# Patient Record
Sex: FEMALE | HISPANIC OR LATINO | Employment: FULL TIME | URBAN - METROPOLITAN AREA
[De-identification: names, ages, dates, MRNs, and addresses within clinical notes are randomized per-mention and may not be internally consistent; named-entity substitution may affect disease eponyms.]

---

## 2016-12-26 LAB
EXTERNAL ABO GROUPING: NORMAL
EXTERNAL HEMATOCRIT: 33.1 %
EXTERNAL HEMOGLOBIN: 10.8 G/DL
EXTERNAL HEPATITIS B SURFACE ANTIGEN: NORMAL
EXTERNAL HIV-1 ANTIBODY: NORMAL
EXTERNAL PLATELET COUNT: 160 K/ΜL
EXTERNAL RH FACTOR: POSITIVE
EXTERNAL RUBELLA IGG QUANTITATION: NORMAL
EXTERNAL SYPHILIS RPR SCREEN: NORMAL
GLUCOSE 1H P 50 G GLC PO SERPL-MCNC: 86 MG/DL (ref 70–183)

## 2017-02-01 ENCOUNTER — ALLSCRIPTS OFFICE VISIT (OUTPATIENT)
Dept: OTHER | Facility: OTHER | Age: 37
End: 2017-02-01

## 2017-02-01 DIAGNOSIS — Z34.82 ENCOUNTER FOR SUPERVISION OF OTHER NORMAL PREGNANCY, SECOND TRIMESTER: ICD-10-CM

## 2017-02-01 LAB
GLUCOSE (HISTORICAL): NEGATIVE
PROT UR STRIP-MCNC: NORMAL MG/DL

## 2017-02-20 ENCOUNTER — GENERIC CONVERSION - ENCOUNTER (OUTPATIENT)
Dept: OTHER | Facility: OTHER | Age: 37
End: 2017-02-20

## 2017-03-08 ENCOUNTER — GENERIC CONVERSION - ENCOUNTER (OUTPATIENT)
Dept: OTHER | Facility: OTHER | Age: 37
End: 2017-03-08

## 2017-03-16 ENCOUNTER — GENERIC CONVERSION - ENCOUNTER (OUTPATIENT)
Dept: OTHER | Facility: OTHER | Age: 37
End: 2017-03-16

## 2017-03-16 ENCOUNTER — ALLSCRIPTS OFFICE VISIT (OUTPATIENT)
Dept: PERINATAL CARE | Facility: CLINIC | Age: 37
End: 2017-03-16
Payer: COMMERCIAL

## 2017-03-16 PROCEDURE — 76816 OB US FOLLOW-UP PER FETUS: CPT | Performed by: OBSTETRICS & GYNECOLOGY

## 2017-03-22 ENCOUNTER — GENERIC CONVERSION - ENCOUNTER (OUTPATIENT)
Dept: OTHER | Facility: OTHER | Age: 37
End: 2017-03-22

## 2017-04-01 ENCOUNTER — ALLSCRIPTS OFFICE VISIT (OUTPATIENT)
Dept: OTHER | Facility: OTHER | Age: 37
End: 2017-04-01

## 2017-04-01 LAB — EXTERNAL GROUP B STREP ANTIGEN: NEGATIVE

## 2017-04-03 LAB — STREP GRP B, MOLECULAR (HISTORICAL): NEGATIVE

## 2017-04-21 ENCOUNTER — GENERIC CONVERSION - ENCOUNTER (OUTPATIENT)
Dept: OTHER | Facility: OTHER | Age: 37
End: 2017-04-21

## 2017-04-30 ENCOUNTER — HOSPITAL ENCOUNTER (INPATIENT)
Facility: HOSPITAL | Age: 37
LOS: 2 days | Discharge: HOME | End: 2017-05-02
Attending: OBSTETRICS & GYNECOLOGY | Admitting: OBSTETRICS & GYNECOLOGY
Payer: COMMERCIAL

## 2017-04-30 ENCOUNTER — ANESTHESIA EVENT (INPATIENT)
Dept: LABOR AND DELIVERY | Facility: HOSPITAL | Age: 37
End: 2017-04-30
Payer: COMMERCIAL

## 2017-04-30 ENCOUNTER — ANESTHESIA (INPATIENT)
Dept: LABOR AND DELIVERY | Facility: HOSPITAL | Age: 37
End: 2017-04-30
Payer: COMMERCIAL

## 2017-04-30 DIAGNOSIS — Z3A.39 39 WEEKS GESTATION OF PREGNANCY: Primary | ICD-10-CM

## 2017-04-30 LAB
ABO GROUP BLD: NORMAL
BASE EXCESS BLDCOA CALC-SCNC: -2.7 MMOL/L (ref 3–11)
BASE EXCESS BLDCOV CALC-SCNC: -3.5 MMOL/L (ref 1–9)
BASOPHILS # BLD AUTO: 0.01 THOUSANDS/ΜL (ref 0–0.1)
BASOPHILS NFR BLD AUTO: 0 % (ref 0–1)
BLD GP AB SCN SERPL QL: NEGATIVE
EOSINOPHIL # BLD AUTO: 0.03 THOUSAND/ΜL (ref 0–0.61)
EOSINOPHIL NFR BLD AUTO: 0 % (ref 0–6)
ERYTHROCYTE [DISTWIDTH] IN BLOOD BY AUTOMATED COUNT: 14.3 % (ref 11.6–15.1)
HCO3 BLDCOA-SCNC: 23.7 MMOL/L (ref 17.3–27.3)
HCO3 BLDCOV-SCNC: 21.2 MMOL/L (ref 12.2–28.6)
HCT VFR BLD AUTO: 35.8 % (ref 34.8–46.1)
HGB BLD-MCNC: 11.8 G/DL (ref 11.5–15.4)
LYMPHOCYTES # BLD AUTO: 1.22 THOUSANDS/ΜL (ref 0.6–4.47)
LYMPHOCYTES NFR BLD AUTO: 13 % (ref 14–44)
MCH RBC QN AUTO: 28.5 PG (ref 26.8–34.3)
MCHC RBC AUTO-ENTMCNC: 33 G/DL (ref 31.4–37.4)
MCV RBC AUTO: 87 FL (ref 82–98)
MONOCYTES # BLD AUTO: 0.43 THOUSAND/ΜL (ref 0.17–1.22)
MONOCYTES NFR BLD AUTO: 5 % (ref 4–12)
NEUTROPHILS # BLD AUTO: 7.52 THOUSANDS/ΜL (ref 1.85–7.62)
NEUTS SEG NFR BLD AUTO: 82 % (ref 43–75)
NRBC BLD AUTO-RTO: 0 /100 WBCS
O2 CT VFR BLDCOA CALC: 6.2 ML/DL
OXYHGB MFR BLDCOA: 30.3 %
OXYHGB MFR BLDCOV: 69.1 %
PCO2 BLDCOA: 47.1 MM[HG] (ref 30–60)
PCO2 BLDCOV: 37.5 MM HG (ref 27–43)
PH BLDCOA: 7.32 [PH] (ref 7.23–7.43)
PH BLDCOV: 7.37 [PH] (ref 7.19–7.49)
PLATELET # BLD AUTO: 128 THOUSANDS/UL (ref 149–390)
PMV BLD AUTO: 11.6 FL (ref 8.9–12.7)
PO2 BLDCOA: 15.6 MM HG (ref 5–25)
PO2 BLDCOV: 28.6 MM HG (ref 15–45)
RBC # BLD AUTO: 4.14 MILLION/UL (ref 3.81–5.12)
RH BLD: POSITIVE
SAO2 % BLDCOV: 14.4 ML/DL
SPECIMEN EXPIRATION DATE: NORMAL
WBC # BLD AUTO: 9.28 THOUSAND/UL (ref 4.31–10.16)

## 2017-04-30 PROCEDURE — 85025 COMPLETE CBC W/AUTO DIFF WBC: CPT | Performed by: OBSTETRICS & GYNECOLOGY

## 2017-04-30 PROCEDURE — 86592 SYPHILIS TEST NON-TREP QUAL: CPT | Performed by: OBSTETRICS & GYNECOLOGY

## 2017-04-30 PROCEDURE — 86850 RBC ANTIBODY SCREEN: CPT | Performed by: OBSTETRICS & GYNECOLOGY

## 2017-04-30 PROCEDURE — 86900 BLOOD TYPING SEROLOGIC ABO: CPT | Performed by: OBSTETRICS & GYNECOLOGY

## 2017-04-30 PROCEDURE — 86901 BLOOD TYPING SEROLOGIC RH(D): CPT | Performed by: OBSTETRICS & GYNECOLOGY

## 2017-04-30 PROCEDURE — 99214 OFFICE O/P EST MOD 30 MIN: CPT

## 2017-04-30 PROCEDURE — 82805 BLOOD GASES W/O2 SATURATION: CPT | Performed by: OBSTETRICS & GYNECOLOGY

## 2017-04-30 PROCEDURE — 88307 TISSUE EXAM BY PATHOLOGIST: CPT | Performed by: OBSTETRICS & GYNECOLOGY

## 2017-04-30 RX ORDER — OXYTOCIN/RINGER'S LACTATE 30/500 ML
PLASTIC BAG, INJECTION (ML) INTRAVENOUS
Status: COMPLETED
Start: 2017-04-30 | End: 2017-04-30

## 2017-04-30 RX ORDER — LIDOCAINE HYDROCHLORIDE AND EPINEPHRINE 15; 5 MG/ML; UG/ML
INJECTION, SOLUTION EPIDURAL AS NEEDED
Status: DISCONTINUED | OUTPATIENT
Start: 2017-04-30 | End: 2017-04-30 | Stop reason: SURG

## 2017-04-30 RX ORDER — OXYCODONE HYDROCHLORIDE AND ACETAMINOPHEN 5; 325 MG/1; MG/1
2 TABLET ORAL EVERY 4 HOURS PRN
Status: DISCONTINUED | OUTPATIENT
Start: 2017-04-30 | End: 2017-05-02 | Stop reason: HOSPADM

## 2017-04-30 RX ORDER — ACETAMINOPHEN 325 MG/1
650 TABLET ORAL EVERY 6 HOURS PRN
Status: DISCONTINUED | OUTPATIENT
Start: 2017-04-30 | End: 2017-05-02 | Stop reason: HOSPADM

## 2017-04-30 RX ORDER — DOCUSATE SODIUM 100 MG/1
100 CAPSULE, LIQUID FILLED ORAL 2 TIMES DAILY
Status: DISCONTINUED | OUTPATIENT
Start: 2017-04-30 | End: 2017-05-02 | Stop reason: HOSPADM

## 2017-04-30 RX ORDER — ONDANSETRON 2 MG/ML
INJECTION INTRAMUSCULAR; INTRAVENOUS
Status: COMPLETED
Start: 2017-04-30 | End: 2017-04-30

## 2017-04-30 RX ORDER — IBUPROFEN 600 MG/1
600 TABLET ORAL EVERY 6 HOURS PRN
Status: DISCONTINUED | OUTPATIENT
Start: 2017-04-30 | End: 2017-05-02 | Stop reason: HOSPADM

## 2017-04-30 RX ORDER — SODIUM CHLORIDE, SODIUM LACTATE, POTASSIUM CHLORIDE, CALCIUM CHLORIDE 600; 310; 30; 20 MG/100ML; MG/100ML; MG/100ML; MG/100ML
125 INJECTION, SOLUTION INTRAVENOUS CONTINUOUS
Status: DISCONTINUED | OUTPATIENT
Start: 2017-04-30 | End: 2017-04-30

## 2017-04-30 RX ORDER — ONDANSETRON 2 MG/ML
4 INJECTION INTRAMUSCULAR; INTRAVENOUS ONCE
Status: COMPLETED | OUTPATIENT
Start: 2017-04-30 | End: 2017-04-30

## 2017-04-30 RX ORDER — METOCLOPRAMIDE HYDROCHLORIDE 5 MG/ML
10 INJECTION INTRAMUSCULAR; INTRAVENOUS EVERY 6 HOURS PRN
Status: DISCONTINUED | OUTPATIENT
Start: 2017-04-30 | End: 2017-04-30

## 2017-04-30 RX ORDER — ROPIVACAINE HYDROCHLORIDE 2 MG/ML
INJECTION, SOLUTION EPIDURAL; INFILTRATION; PERINEURAL AS NEEDED
Status: DISCONTINUED | OUTPATIENT
Start: 2017-04-30 | End: 2017-04-30 | Stop reason: SURG

## 2017-04-30 RX ORDER — OXYTOCIN/RINGER'S LACTATE 30/500 ML
1-30 PLASTIC BAG, INJECTION (ML) INTRAVENOUS
Status: DISCONTINUED | OUTPATIENT
Start: 2017-04-30 | End: 2017-04-30

## 2017-04-30 RX ORDER — DIAPER,BRIEF,INFANT-TODD,DISP
1 EACH MISCELLANEOUS AS NEEDED
Status: DISCONTINUED | OUTPATIENT
Start: 2017-04-30 | End: 2017-05-02 | Stop reason: HOSPADM

## 2017-04-30 RX ORDER — FERROUS SULFATE 325(65) MG
325 TABLET ORAL
COMMUNITY
End: 2017-06-07

## 2017-04-30 RX ORDER — CALCIUM CARBONATE 200(500)MG
1000 TABLET,CHEWABLE ORAL DAILY PRN
Status: DISCONTINUED | OUTPATIENT
Start: 2017-04-30 | End: 2017-05-02 | Stop reason: HOSPADM

## 2017-04-30 RX ORDER — OXYCODONE HYDROCHLORIDE AND ACETAMINOPHEN 5; 325 MG/1; MG/1
1 TABLET ORAL EVERY 4 HOURS PRN
Status: DISCONTINUED | OUTPATIENT
Start: 2017-04-30 | End: 2017-05-02 | Stop reason: HOSPADM

## 2017-04-30 RX ORDER — ONDANSETRON 2 MG/ML
4 INJECTION INTRAMUSCULAR; INTRAVENOUS EVERY 8 HOURS PRN
Status: DISCONTINUED | OUTPATIENT
Start: 2017-04-30 | End: 2017-05-02 | Stop reason: HOSPADM

## 2017-04-30 RX ADMIN — SODIUM CHLORIDE, SODIUM LACTATE, POTASSIUM CHLORIDE, AND CALCIUM CHLORIDE 1000 ML: .6; .31; .03; .02 INJECTION, SOLUTION INTRAVENOUS at 11:45

## 2017-04-30 RX ADMIN — LIDOCAINE HYDROCHLORIDE AND EPINEPHRINE 3 ML: 15; 5 INJECTION, SOLUTION EPIDURAL at 13:28

## 2017-04-30 RX ADMIN — ONDANSETRON 4 MG: 2 INJECTION INTRAMUSCULAR; INTRAVENOUS at 15:09

## 2017-04-30 RX ADMIN — METOCLOPRAMIDE 10 MG: 5 INJECTION, SOLUTION INTRAMUSCULAR; INTRAVENOUS at 17:51

## 2017-04-30 RX ADMIN — WITCH HAZEL 1 PAD: 500 SOLUTION RECTAL; TOPICAL at 22:00

## 2017-04-30 RX ADMIN — BENZOCAINE AND MENTHOL: 20; .5 SPRAY TOPICAL at 22:00

## 2017-04-30 RX ADMIN — DOCUSATE SODIUM 100 MG: 100 CAPSULE, LIQUID FILLED ORAL at 22:00

## 2017-04-30 RX ADMIN — IBUPROFEN 600 MG: 600 TABLET, FILM COATED ORAL at 23:13

## 2017-04-30 RX ADMIN — SODIUM CHLORIDE, SODIUM LACTATE, POTASSIUM CHLORIDE, AND CALCIUM CHLORIDE 125 ML/HR: .6; .31; .03; .02 INJECTION, SOLUTION INTRAVENOUS at 17:37

## 2017-04-30 RX ADMIN — SODIUM CHLORIDE, SODIUM LACTATE, POTASSIUM CHLORIDE, AND CALCIUM CHLORIDE 125 ML/HR: .6; .31; .03; .02 INJECTION, SOLUTION INTRAVENOUS at 12:38

## 2017-04-30 RX ADMIN — ROPIVACAINE HYDROCHLORIDE 10 ML: 2 INJECTION, SOLUTION EPIDURAL; INFILTRATION at 13:31

## 2017-04-30 RX ADMIN — HYDROCORTISONE 1 APPLICATION: 10 CREAM TOPICAL at 22:00

## 2017-04-30 RX ADMIN — ROPIVACAINE HYDROCHLORIDE 8 ML: 2 INJECTION, SOLUTION EPIDURAL; INFILTRATION at 13:52

## 2017-04-30 RX ADMIN — Medication 2 MILLI-UNITS/MIN: at 15:57

## 2017-04-30 RX ADMIN — LIDOCAINE HYDROCHLORIDE AND EPINEPHRINE 3 ML: 15; 5 INJECTION, SOLUTION EPIDURAL at 13:48

## 2017-05-01 LAB — RPR SER QL: NORMAL

## 2017-05-01 PROCEDURE — 90715 TDAP VACCINE 7 YRS/> IM: CPT | Performed by: OBSTETRICS & GYNECOLOGY

## 2017-05-01 RX ADMIN — TETANUS TOXOID, REDUCED DIPHTHERIA TOXOID AND ACELLULAR PERTUSSIS VACCINE, ADSORBED 0.5 ML: 5; 2.5; 8; 8; 2.5 SUSPENSION INTRAMUSCULAR at 12:53

## 2017-05-01 RX ADMIN — DOCUSATE SODIUM 100 MG: 100 CAPSULE, LIQUID FILLED ORAL at 09:36

## 2017-05-01 RX ADMIN — GLYCERIN, PETROLATUM, PHENYLEPHRINE HCL, PRAMOXINE HCL: 144; 2.5; 10; 15 CREAM TOPICAL at 17:31

## 2017-05-01 RX ADMIN — IBUPROFEN 600 MG: 600 TABLET, FILM COATED ORAL at 17:35

## 2017-05-01 RX ADMIN — DOCUSATE SODIUM 100 MG: 100 CAPSULE, LIQUID FILLED ORAL at 17:31

## 2017-05-01 RX ADMIN — MAGNESIUM SULFATE: 1 CRYSTAL ORAL; TOPICAL at 21:20

## 2017-05-02 VITALS
SYSTOLIC BLOOD PRESSURE: 106 MMHG | TEMPERATURE: 98.5 F | WEIGHT: 154 LBS | HEART RATE: 65 BPM | RESPIRATION RATE: 18 BRPM | DIASTOLIC BLOOD PRESSURE: 70 MMHG | BODY MASS INDEX: 28.34 KG/M2 | HEIGHT: 62 IN

## 2017-05-02 RX ORDER — DOCUSATE SODIUM 100 MG/1
100 CAPSULE, LIQUID FILLED ORAL 2 TIMES DAILY PRN
Qty: 60 CAPSULE | Refills: 0
Start: 2017-05-02 | End: 2017-06-12 | Stop reason: HOSPADM

## 2017-05-02 RX ORDER — IBUPROFEN 600 MG/1
600 TABLET ORAL EVERY 6 HOURS PRN
Qty: 30 TABLET | Refills: 0
Start: 2017-05-02 | End: 2017-06-12 | Stop reason: HOSPADM

## 2017-05-02 RX ORDER — ACETAMINOPHEN 325 MG/1
650 TABLET ORAL EVERY 4 HOURS PRN
Qty: 30 TABLET | Refills: 0
Start: 2017-05-02 | End: 2017-06-07

## 2017-05-02 RX ORDER — DIAPER,BRIEF,INFANT-TODD,DISP
1 EACH MISCELLANEOUS AS NEEDED
Qty: 30 G | Refills: 0
Start: 2017-05-02 | End: 2017-06-12 | Stop reason: HOSPADM

## 2017-05-02 RX ADMIN — BENZOCAINE AND MENTHOL: 20; .5 SPRAY TOPICAL at 11:19

## 2017-05-02 RX ADMIN — WITCH HAZEL 1 PAD: 500 SOLUTION RECTAL; TOPICAL at 11:19

## 2017-05-02 RX ADMIN — IBUPROFEN 600 MG: 600 TABLET, FILM COATED ORAL at 08:59

## 2017-05-02 RX ADMIN — DOCUSATE SODIUM 100 MG: 100 CAPSULE, LIQUID FILLED ORAL at 08:59

## 2017-05-07 ENCOUNTER — TELEPHONE (OUTPATIENT)
Dept: LABOR AND DELIVERY | Facility: HOSPITAL | Age: 37
End: 2017-05-07

## 2017-05-26 ENCOUNTER — ALLSCRIPTS OFFICE VISIT (OUTPATIENT)
Dept: OTHER | Facility: OTHER | Age: 37
End: 2017-05-26

## 2017-06-01 ENCOUNTER — GENERIC CONVERSION - ENCOUNTER (OUTPATIENT)
Dept: OTHER | Facility: OTHER | Age: 37
End: 2017-06-01

## 2017-06-01 DIAGNOSIS — Z01.818 ENCOUNTER FOR OTHER PREPROCEDURAL EXAMINATION: ICD-10-CM

## 2017-06-07 ENCOUNTER — TRANSCRIBE ORDERS (OUTPATIENT)
Dept: LAB | Facility: CLINIC | Age: 37
End: 2017-06-07

## 2017-06-07 ENCOUNTER — APPOINTMENT (OUTPATIENT)
Dept: LAB | Facility: CLINIC | Age: 37
End: 2017-06-07
Payer: COMMERCIAL

## 2017-06-07 DIAGNOSIS — Z01.818 ENCOUNTER FOR OTHER PREPROCEDURAL EXAMINATION: ICD-10-CM

## 2017-06-07 LAB
ALBUMIN SERPL BCP-MCNC: 3.7 G/DL (ref 3.5–5)
ALP SERPL-CCNC: 72 U/L (ref 46–116)
ALT SERPL W P-5'-P-CCNC: 57 U/L (ref 12–78)
ANION GAP SERPL CALCULATED.3IONS-SCNC: 10 MMOL/L (ref 4–13)
AST SERPL W P-5'-P-CCNC: 34 U/L (ref 5–45)
BASOPHILS # BLD AUTO: 0.01 THOUSANDS/ΜL (ref 0–0.1)
BASOPHILS NFR BLD AUTO: 0 % (ref 0–1)
BILIRUB SERPL-MCNC: 0.4 MG/DL (ref 0.2–1)
BUN SERPL-MCNC: 29 MG/DL (ref 5–25)
CALCIUM SERPL-MCNC: 8.6 MG/DL (ref 8.3–10.1)
CHLORIDE SERPL-SCNC: 107 MMOL/L (ref 100–108)
CO2 SERPL-SCNC: 26 MMOL/L (ref 21–32)
CREAT SERPL-MCNC: 1.02 MG/DL (ref 0.6–1.3)
EOSINOPHIL # BLD AUTO: 0.07 THOUSAND/ΜL (ref 0–0.61)
EOSINOPHIL NFR BLD AUTO: 2 % (ref 0–6)
ERYTHROCYTE [DISTWIDTH] IN BLOOD BY AUTOMATED COUNT: 13.2 % (ref 11.6–15.1)
GFR SERPL CREATININE-BSD FRML MDRD: >60 ML/MIN/1.73SQ M
GLUCOSE SERPL-MCNC: 84 MG/DL (ref 65–140)
HCT VFR BLD AUTO: 38.7 % (ref 34.8–46.1)
HGB BLD-MCNC: 12.7 G/DL (ref 11.5–15.4)
LYMPHOCYTES # BLD AUTO: 1.79 THOUSANDS/ΜL (ref 0.6–4.47)
LYMPHOCYTES NFR BLD AUTO: 39 % (ref 14–44)
MCH RBC QN AUTO: 27.7 PG (ref 26.8–34.3)
MCHC RBC AUTO-ENTMCNC: 32.8 G/DL (ref 31.4–37.4)
MCV RBC AUTO: 85 FL (ref 82–98)
MONOCYTES # BLD AUTO: 0.33 THOUSAND/ΜL (ref 0.17–1.22)
MONOCYTES NFR BLD AUTO: 7 % (ref 4–12)
NEUTROPHILS # BLD AUTO: 2.35 THOUSANDS/ΜL (ref 1.85–7.62)
NEUTS SEG NFR BLD AUTO: 52 % (ref 43–75)
PLATELET # BLD AUTO: 141 THOUSANDS/UL (ref 149–390)
PMV BLD AUTO: 11.4 FL (ref 8.9–12.7)
POTASSIUM SERPL-SCNC: 3.7 MMOL/L (ref 3.5–5.3)
PROT SERPL-MCNC: 7.7 G/DL (ref 6.4–8.2)
RBC # BLD AUTO: 4.58 MILLION/UL (ref 3.81–5.12)
SODIUM SERPL-SCNC: 143 MMOL/L (ref 136–145)
T4 FREE SERPL-MCNC: 0.79 NG/DL (ref 0.76–1.46)
TSH SERPL DL<=0.05 MIU/L-ACNC: 1.27 UIU/ML (ref 0.36–3.74)
WBC # BLD AUTO: 4.55 THOUSAND/UL (ref 4.31–10.16)

## 2017-06-07 PROCEDURE — 80053 COMPREHEN METABOLIC PANEL: CPT

## 2017-06-07 PROCEDURE — 84443 ASSAY THYROID STIM HORMONE: CPT

## 2017-06-07 PROCEDURE — 84439 ASSAY OF FREE THYROXINE: CPT

## 2017-06-07 PROCEDURE — 36415 COLL VENOUS BLD VENIPUNCTURE: CPT

## 2017-06-07 PROCEDURE — 85025 COMPLETE CBC W/AUTO DIFF WBC: CPT

## 2017-06-12 ENCOUNTER — HOSPITAL ENCOUNTER (OUTPATIENT)
Facility: HOSPITAL | Age: 37
Setting detail: OUTPATIENT SURGERY
Discharge: HOME | End: 2017-06-12
Attending: OBSTETRICS & GYNECOLOGY | Admitting: OBSTETRICS & GYNECOLOGY
Payer: COMMERCIAL

## 2017-06-12 ENCOUNTER — ANESTHESIA (OUTPATIENT)
Dept: PERIOP | Facility: HOSPITAL | Age: 37
End: 2017-06-12
Payer: COMMERCIAL

## 2017-06-12 ENCOUNTER — ANESTHESIA EVENT (OUTPATIENT)
Dept: PERIOP | Facility: HOSPITAL | Age: 37
End: 2017-06-12
Payer: COMMERCIAL

## 2017-06-12 VITALS
WEIGHT: 130 LBS | HEART RATE: 65 BPM | HEIGHT: 64 IN | DIASTOLIC BLOOD PRESSURE: 77 MMHG | BODY MASS INDEX: 22.2 KG/M2 | RESPIRATION RATE: 18 BRPM | OXYGEN SATURATION: 100 % | SYSTOLIC BLOOD PRESSURE: 119 MMHG | TEMPERATURE: 97.3 F

## 2017-06-12 LAB — EXT PREGNANCY TEST URINE: NEGATIVE

## 2017-06-12 PROCEDURE — 81025 URINE PREGNANCY TEST: CPT | Performed by: OBSTETRICS & GYNECOLOGY

## 2017-06-12 DEVICE — FALOPE-RING BANDS
Type: IMPLANTABLE DEVICE | Site: FALLOPIAN TUBE | Status: FUNCTIONAL
Brand: FALOPE-RING

## 2017-06-12 RX ORDER — GLYCOPYRROLATE 0.2 MG/ML
INJECTION INTRAMUSCULAR; INTRAVENOUS AS NEEDED
Status: DISCONTINUED | OUTPATIENT
Start: 2017-06-12 | End: 2017-06-12 | Stop reason: SURG

## 2017-06-12 RX ORDER — METOCLOPRAMIDE HYDROCHLORIDE 5 MG/ML
INJECTION INTRAMUSCULAR; INTRAVENOUS AS NEEDED
Status: DISCONTINUED | OUTPATIENT
Start: 2017-06-12 | End: 2017-06-12 | Stop reason: SURG

## 2017-06-12 RX ORDER — ONDANSETRON 2 MG/ML
4 INJECTION INTRAMUSCULAR; INTRAVENOUS ONCE AS NEEDED
Status: DISCONTINUED | OUTPATIENT
Start: 2017-06-12 | End: 2017-06-12 | Stop reason: HOSPADM

## 2017-06-12 RX ORDER — SODIUM CHLORIDE, SODIUM LACTATE, POTASSIUM CHLORIDE, CALCIUM CHLORIDE 600; 310; 30; 20 MG/100ML; MG/100ML; MG/100ML; MG/100ML
125 INJECTION, SOLUTION INTRAVENOUS CONTINUOUS
Status: DISCONTINUED | OUTPATIENT
Start: 2017-06-12 | End: 2017-06-12

## 2017-06-12 RX ORDER — MIDAZOLAM HYDROCHLORIDE 1 MG/ML
INJECTION INTRAMUSCULAR; INTRAVENOUS AS NEEDED
Status: DISCONTINUED | OUTPATIENT
Start: 2017-06-12 | End: 2017-06-12 | Stop reason: SURG

## 2017-06-12 RX ORDER — SODIUM CHLORIDE, SODIUM LACTATE, POTASSIUM CHLORIDE, CALCIUM CHLORIDE 600; 310; 30; 20 MG/100ML; MG/100ML; MG/100ML; MG/100ML
125 INJECTION, SOLUTION INTRAVENOUS CONTINUOUS
Status: DISCONTINUED | OUTPATIENT
Start: 2017-06-12 | End: 2017-06-12 | Stop reason: HOSPADM

## 2017-06-12 RX ORDER — PROMETHAZINE HYDROCHLORIDE 25 MG/ML
25 INJECTION, SOLUTION INTRAMUSCULAR; INTRAVENOUS ONCE AS NEEDED
Status: DISCONTINUED | OUTPATIENT
Start: 2017-06-12 | End: 2017-06-12

## 2017-06-12 RX ORDER — ONDANSETRON 2 MG/ML
4 INJECTION INTRAMUSCULAR; INTRAVENOUS EVERY 6 HOURS PRN
Status: DISCONTINUED | OUTPATIENT
Start: 2017-06-12 | End: 2017-06-12

## 2017-06-12 RX ORDER — IBUPROFEN 600 MG/1
600 TABLET ORAL EVERY 6 HOURS PRN
Qty: 30 TABLET | Refills: 0
Start: 2017-06-12 | End: 2020-07-23 | Stop reason: ALTCHOICE

## 2017-06-12 RX ORDER — ONDANSETRON 2 MG/ML
4 INJECTION INTRAMUSCULAR; INTRAVENOUS EVERY 6 HOURS PRN
Status: DISCONTINUED | OUTPATIENT
Start: 2017-06-12 | End: 2017-06-12 | Stop reason: HOSPADM

## 2017-06-12 RX ORDER — FENTANYL CITRATE 50 UG/ML
INJECTION, SOLUTION INTRAMUSCULAR; INTRAVENOUS AS NEEDED
Status: DISCONTINUED | OUTPATIENT
Start: 2017-06-12 | End: 2017-06-12 | Stop reason: SURG

## 2017-06-12 RX ORDER — KETOROLAC TROMETHAMINE 30 MG/ML
INJECTION, SOLUTION INTRAMUSCULAR; INTRAVENOUS AS NEEDED
Status: DISCONTINUED | OUTPATIENT
Start: 2017-06-12 | End: 2017-06-12 | Stop reason: SURG

## 2017-06-12 RX ORDER — ROCURONIUM BROMIDE 10 MG/ML
INJECTION, SOLUTION INTRAVENOUS AS NEEDED
Status: DISCONTINUED | OUTPATIENT
Start: 2017-06-12 | End: 2017-06-12 | Stop reason: SURG

## 2017-06-12 RX ORDER — PROPOFOL 10 MG/ML
INJECTION, EMULSION INTRAVENOUS AS NEEDED
Status: DISCONTINUED | OUTPATIENT
Start: 2017-06-12 | End: 2017-06-12 | Stop reason: SURG

## 2017-06-12 RX ORDER — LIDOCAINE HYDROCHLORIDE 10 MG/ML
INJECTION, SOLUTION INFILTRATION; PERINEURAL AS NEEDED
Status: DISCONTINUED | OUTPATIENT
Start: 2017-06-12 | End: 2017-06-12 | Stop reason: SURG

## 2017-06-12 RX ORDER — ONDANSETRON 2 MG/ML
INJECTION INTRAMUSCULAR; INTRAVENOUS AS NEEDED
Status: DISCONTINUED | OUTPATIENT
Start: 2017-06-12 | End: 2017-06-12 | Stop reason: SURG

## 2017-06-12 RX ORDER — FENTANYL CITRATE/PF 50 MCG/ML
25 SYRINGE (ML) INJECTION
Status: DISCONTINUED | OUTPATIENT
Start: 2017-06-12 | End: 2017-06-12 | Stop reason: HOSPADM

## 2017-06-12 RX ORDER — IBUPROFEN 600 MG/1
600 TABLET ORAL EVERY 6 HOURS PRN
Status: DISCONTINUED | OUTPATIENT
Start: 2017-06-12 | End: 2017-06-12

## 2017-06-12 RX ORDER — BUPIVACAINE HYDROCHLORIDE 2.5 MG/ML
INJECTION, SOLUTION INFILTRATION; PERINEURAL AS NEEDED
Status: DISCONTINUED | OUTPATIENT
Start: 2017-06-12 | End: 2017-06-12 | Stop reason: HOSPADM

## 2017-06-12 RX ORDER — OXYCODONE HYDROCHLORIDE 5 MG/1
5 TABLET ORAL EVERY 4 HOURS PRN
Status: DISCONTINUED | OUTPATIENT
Start: 2017-06-12 | End: 2017-06-12

## 2017-06-12 RX ORDER — FENTANYL CITRATE/PF 50 MCG/ML
25 SYRINGE (ML) INJECTION
Status: DISCONTINUED | OUTPATIENT
Start: 2017-06-12 | End: 2017-06-12

## 2017-06-12 RX ORDER — ESMOLOL HYDROCHLORIDE 10 MG/ML
INJECTION INTRAVENOUS AS NEEDED
Status: DISCONTINUED | OUTPATIENT
Start: 2017-06-12 | End: 2017-06-12 | Stop reason: SURG

## 2017-06-12 RX ORDER — OXYCODONE HYDROCHLORIDE AND ACETAMINOPHEN 5; 325 MG/1; MG/1
1 TABLET ORAL EVERY 4 HOURS PRN
Status: DISCONTINUED | OUTPATIENT
Start: 2017-06-12 | End: 2017-06-12

## 2017-06-12 RX ADMIN — ROCURONIUM BROMIDE 30 MG: 10 INJECTION, SOLUTION INTRAVENOUS at 07:37

## 2017-06-12 RX ADMIN — FENTANYL CITRATE 50 MCG: 50 INJECTION, SOLUTION INTRAMUSCULAR; INTRAVENOUS at 07:45

## 2017-06-12 RX ADMIN — SODIUM CHLORIDE, SODIUM LACTATE, POTASSIUM CHLORIDE, AND CALCIUM CHLORIDE: .6; .31; .03; .02 INJECTION, SOLUTION INTRAVENOUS at 07:30

## 2017-06-12 RX ADMIN — LIDOCAINE HYDROCHLORIDE 50 MG: 10 INJECTION, SOLUTION INFILTRATION; PERINEURAL at 07:37

## 2017-06-12 RX ADMIN — GLYCOPYRROLATE 0.6 MG: 0.2 INJECTION INTRAMUSCULAR; INTRAVENOUS at 08:17

## 2017-06-12 RX ADMIN — METOCLOPRAMIDE HYDROCHLORIDE 10 MG: 5 INJECTION INTRAMUSCULAR; INTRAVENOUS at 07:42

## 2017-06-12 RX ADMIN — DEXAMETHASONE SODIUM PHOSPHATE 5 MG: 10 INJECTION INTRAMUSCULAR; INTRAVENOUS at 07:42

## 2017-06-12 RX ADMIN — CEFAZOLIN SODIUM 1000 MG: 1 SOLUTION INTRAVENOUS at 07:48

## 2017-06-12 RX ADMIN — ESMOLOL HYDROCHLORIDE 30 MG: 100 INJECTION, SOLUTION INTRAVENOUS at 07:47

## 2017-06-12 RX ADMIN — SODIUM CHLORIDE, SODIUM LACTATE, POTASSIUM CHLORIDE, AND CALCIUM CHLORIDE 125 ML/HR: .6; .31; .03; .02 INJECTION, SOLUTION INTRAVENOUS at 09:26

## 2017-06-12 RX ADMIN — NEOSTIGMINE METHYLSULFATE 4 MG: 1 INJECTION, SOLUTION INTRAMUSCULAR; INTRAVENOUS; SUBCUTANEOUS at 08:17

## 2017-06-12 RX ADMIN — FENTANYL CITRATE 50 MCG: 50 INJECTION, SOLUTION INTRAMUSCULAR; INTRAVENOUS at 07:43

## 2017-06-12 RX ADMIN — KETOROLAC TROMETHAMINE 30 MG: 30 INJECTION, SOLUTION INTRAMUSCULAR at 08:14

## 2017-06-12 RX ADMIN — MIDAZOLAM HYDROCHLORIDE 2 MG: 1 INJECTION, SOLUTION INTRAMUSCULAR; INTRAVENOUS at 07:27

## 2017-06-12 RX ADMIN — PROPOFOL 200 MG: 10 INJECTION, EMULSION INTRAVENOUS at 07:37

## 2017-06-12 RX ADMIN — SODIUM CHLORIDE, SODIUM LACTATE, POTASSIUM CHLORIDE, AND CALCIUM CHLORIDE: .6; .31; .03; .02 INJECTION, SOLUTION INTRAVENOUS at 08:15

## 2017-06-12 RX ADMIN — FENTANYL CITRATE 25 MCG: 50 INJECTION INTRAMUSCULAR; INTRAVENOUS at 09:10

## 2017-06-12 RX ADMIN — ONDANSETRON 4 MG: 2 INJECTION INTRAMUSCULAR; INTRAVENOUS at 08:14

## 2017-06-22 ENCOUNTER — ALLSCRIPTS OFFICE VISIT (OUTPATIENT)
Dept: OTHER | Facility: OTHER | Age: 37
End: 2017-06-22

## 2017-10-04 ENCOUNTER — ALLSCRIPTS OFFICE VISIT (OUTPATIENT)
Dept: OTHER | Facility: OTHER | Age: 37
End: 2017-10-04

## 2017-10-06 NOTE — PROGRESS NOTES
Assessment  1  Encounter for gynecological examination without abnormal finding (V72 31) (Z01 419)    Plan  Encounter for gynecological examination without abnormal finding    · (B) PAP WITH HPV PLUS; Status: In Progress - Specimen/Data Collected;   Done:  [id number]   Perform:[employer]; DANYEL:[id number]; Ordered;For:Encounter for gynecological examination without abnormal finding; Ordered By:Na Pearson;  : 9/3/2017    Discussion/Summary  health maintenance visit healthy adult female Currently, she eats a healthy diet  the risks and benefits of cervical cancer screening were discussed cervical cancer screening is current Pap test was done today HPV and Pap Co-testing Done Today Breast cancer screening: the risks and benefits of breast cancer screening were discussed, self breast exam technique was taught, monthly self breast exam was advised and breast cancer screening is not indicated  Colorectal cancer screening: colorectal cancer screening is not indicated  Osteoporosis screening: bone mineral density testing is not indicated  Advice and education were given regarding nutrition, calcium supplements and vitamin D supplements  Patient discussion: discussed with the patient  Return to office for annual or as needed  with patient likely heavy menses secondary to missing menses in August and regulating after baby  Continue to monitor and call office if continues to be irregular  The patient has the current Goals: At goal  The patent has the current Barriers: NONE  Patient is able to Self-Care  Possible side effects of new medications were reviewed with the patient/guardian today  The treatment plan was reviewed with the patient/guardian  The patient/guardian understands and agrees with the treatment plan      History of Present Illness  HPI: 39 yo seen for annual exam  Pt is 5 mo PP doing well   for the first 3 months   Reports menses returned in June, did not have menses in July and then had a long heavy menses in September lasted 10 days and was heavy throughout entire menses  Has not had menses since  Denies bowel or bladder issues  Had Tubal ligation 6/2017  pap: 6/2016 NILM, 2015 NILM (-)HRHPV  GYN , Adult Female [facility]: The patient is being seen for a health maintenance and gynecology evaluation  Social History: She is   Work status: occupation:   The patient has never smoked cigarettes  She reports never drinking alcohol  She has never used illicit drugs  General Health: The patient's health since the last visit is described as good  Lifestyle:  She does not use tobacco -She denies alcohol use  -She denies drug use  Reproductive health: the patient is premenopausal-  she reports menstrual problems  Menstrual history:  age at menarche was 14  LMP: the last menstrual period was 9/3/2017  Recent menstrual periods: bleeding has been normal  The cycles have been regular-and-occur approximately every 28 days  The duration of her recent periods has been regular-and-usually last 3-6 days -she uses contraception  For contraception, she has had a tubal occlusion -she is sexually active  She is monogamous with a male partner-and-declines STD testing, denies abuse     Screening:      Review of Systems  no pelvic pain,-no pelvic pressure,-no vaginal pain,-no vaginal discharge,-no vaginal itching,-no vaginal lump or mass,-no vaginal odor,-no nonmenstrual bleeding,-no vulvar pain,-no vulvar itching,-no vulvar lump or mass,-no labial swelling,-no dysmenorrhea,-denied amenorrhea,-no menorrhagia,-no hypomenorrhea,-no oligomenorrhea,-no decreased time between periods,-periods are regular,-regular length of periods,-no dysuria,-no bladder pain,-no hematuria,-no burning sensation during urination,-no change in urinary frequency,-no feelings of urinary urgency,-no flank pain,-no abnormal urethral discharge,-urine is not foul-smelling,-no urinary hesitancy-and-no urinary loss of control  Constitutional: No fever, no chills, feels well, no tiredness, no recent weight gain or loss  ENT: no ear ache, no loss of hearing, no nosebleeds or nasal discharge, no sore throat or hoarseness  Cardiovascular: no complaints of slow or fast heart rate, no chest pain, no palpitations, no leg claudication or lower extremity edema  Respiratory: no complaints of shortness of breath, no wheezing, no dyspnea on exertion, no orthopnea or PND  Breasts: no complaints of breast pain, breast lump or nipple discharge  Gastrointestinal: no complaints of abdominal pain, no constipation, no nausea or diarrhea, no vomiting, no bloody stools  Genitourinary: no complaints of dysuria, no incontinence, no pelvic pain, no dysmenorrhea, no vaginal discharge or abnormal vaginal bleeding  Musculoskeletal: no complaints of arthralgia, no myalgia, no joint swelling or stiffness, no limb pain or swelling  Integumentary: no complaints of skin rash or lesion, no itching or dry skin, no skin wounds  Neurological: no complaints of headache, no confusion, no numbness or tingling, no dizziness or fainting  OB History  Pregnancy History (Brief):   Prior pregnancies: : 4  Para: 2 (full-term),-1 (abortions)-and-3 (living)   Delivery type: 3 vaginal    x 3: 2005 M,  M 2017 M      Active Problems  1   Status post gynecological surgery, follow-up exam (V6) (Z09)    Past Medical History   · History of Elderly multigravida in second trimester (659 63) (O09 522)   · History of Elderly multigravida, third trimester (659 63) (O09 523)   · History of Encounter for other general counseling or advice on contraception (V2)  (Z30 09)   · History of Encounter for pregnancy related examination in second trimester (V22 1)  (Z34 92)   · History of Encounter for pregnancy related examination in third trimester (V22 1) (Z34 93)   · History of pregnancy (V13 )   · History of Postpartum care following vaginal delivery (V24 2) (Z39 2)   · History of Preoperative testing (V72 84) (Z01 818)   · History of Subchorionic hemorrhage, antepartum (656 83) (O46 8X9,O41 8X90)    Surgical History   · History of Tubal Ligation    Family History  Sister    · Family history of diabetes mellitus (V18 0) (Z83 3)  Spouse    · Family history of hypertension (V17 49) (Z82 49)    Social History   · Cultural background   ·    ·    · Never a smoker   · No alcohol use   · No drug use   · Primary spoken language English    Current Meds   1  Vitafol Ultra 29-0 6-0 4-200 MG CAPS; take 1 capsule daily; Therapy: [id number] to (Evaluate:03Mar2018)  Requested for: 30OUV9417; Last   Rx:08Mar2017 Ordered    Allergies  1  No Known Drug Allergies  2  No Known Environmental Allergies   3  No Known Food Allergies    Vitals   Recorded: 04XQJ3708 00:74AS   Systolic 931, LUE, Sitting   Diastolic 70, LUE, Sitting   Height 5 ft 2 in   Weight 128 lb    BMI Calculated 23 41   BSA Calculated 1 58     Physical Exam    Constitutional   General appearance: No acute distress, well appearing and well nourished  Neck   Neck: Normal, supple, trachea midline, no masses  Thyroid: Normal, no thyromegaly  Pulmonary   Respiratory effort: No increased work of breathing or signs of respiratory distress  Auscultation of lungs: Clear to auscultation  Cardiovascular   Auscultation of heart: Normal rate and rhythm, normal S1 and S2, no murmurs  Peripheral vascular exam: Normal pulses Throughout  Genitourinary   External genitalia: Normal and no lesions appreciated  Vagina: Normal, no lesions or dryness appreciated  Urethra: Normal     Urethral meatus: Normal     Bladder: Normal, soft, non-tender and no prolapse or masses appreciated  Cervix: Normal, no palpable masses  Uterus: Normal, non-tender, not enlarged, and no palpable masses  Adnexa/parametria: Normal, non-tender and no fullness or masses appreciated      Chest   Breasts: Normal and no dimpling or skin changes noted  Abdomen   Abdomen: Normal, non-tender, and no organomegaly noted  Liver and spleen: No hepatomegaly or splenomegaly  Examination for hernias: No hernias appreciated  Lymphatic   Palpation of lymph nodes in neck, axillae, groin and/or other locations: No lymphadenopathy or masses noted  Skin   Skin and subcutaneous tissue: Normal skin turgor and no rashes  Palpation of skin and subcutaneous tissue: Normal     Psychiatric   Orientation to person, place, and time: Normal     Mood and affect: Normal        Attending Note  Collaborating Physician Note: Collaborating Note: I agree with the Advanced Practitioner note   I discussed the case with the Advanced Practitioner and reviewed the AP note      Signatures   Electronically signed by : Nikki Oreilly, [facility]; Oct  4 2017  5:15PM EST                       (Author)    Electronically signed by : Heena Merino MD; Oct  5 2017 10:55AM EST                       (Author)

## 2017-10-11 LAB
HPV 18 (HISTORICAL): NOT DETECTED
HPV HIGH RISK 16/18 (HISTORICAL): DETECTED
HPV16 (HISTORICAL): NOT DETECTED
PAP (HISTORICAL): ABNORMAL

## 2017-11-08 ENCOUNTER — GENERIC CONVERSION - ENCOUNTER (OUTPATIENT)
Dept: OTHER | Facility: OTHER | Age: 37
End: 2017-11-08

## 2017-12-20 ENCOUNTER — ALLSCRIPTS OFFICE VISIT (OUTPATIENT)
Dept: OTHER | Facility: OTHER | Age: 37
End: 2017-12-20

## 2017-12-26 LAB — ENDOCERVICAL BIOPSY (HISTORICAL) 993266: NORMAL

## 2017-12-27 LAB — ENDOMETRIAL BIOPSY (HISTORICAL): NORMAL

## 2018-01-11 NOTE — PROGRESS NOTES
MAR 16 2017         RE: Obey Watts                                     To: [facility]   MR#: [id number]                                   [address]   :  [facility], [address] [place] [address]   ENC: [id number]                             Fax: [phone]   (Exam #: [id number])      The LMP of this 29year old,  G5, P2-0-3-2 patient was 2016, her   working SIMONA is MAY 4 2017 and the current gestational age is 29 weeks 0   days by 1st Trimester Sono  A sonographic examination was performed on MAR   16 2017 using real time equipment  The ultrasound examination was   performed using abdominal technique  The patient has a BMI of 26 5  Her   blood pressure today was 107/53  Iro has no complaints today  She reports regular fetal movements and   denies problems related to hypertension, gestational diabetes,    labor, or vaginal bleeding  Cardiac motion was observed at 128 bpm       INDICATIONS      advanced maternal age      Exam Types      Level I      RESULTS      Fetus # 1 of 1   Vertex presentation   Fetal growth appeared normal   Placenta Location = Posterior, fundal   No placenta previa   Placenta Grade = II      MEASUREMENTS (* Included In Average GA)      AC              30 4 cm        34 weeks 4 days* (72%)   BPD              8 0 cm        32 weeks 1 day * (27%)   HC              30 7 cm        33 weeks 6 days* (48%)   Femur            6 6 cm        33 weeks 5 days* (65%)      Cerebellum       4 4 cm        35 weeks 1 day      HC/AC           1 01   FL/AC           0 22   FL/BPD          0 83   EFW (Ac/Fl/Hc)  2370 grams - 5 lbs 4 oz                 (61%)      THE AVERAGE GESTATIONAL AGE is 33 weeks 4 days +/- 21 days        AMNIOTIC FLUID      Q1: 2 3      Q2: 5 5      Q3: 7 0      Q4: 2 3   ADOLPH Total = 17 1 cm   Amniotic Fluid: Normal      ANATOMY DETAILS      Visualized Appearing Sonographically Normal:   HEAD: (Calvarium, BPD Level, Cavum, Lateral Ventricles, Choroid Plexus,   Cerebellum, Cisterna Magna);    FACE/NECK: (Nose/Lips);    TH  CAV :   (Diaphragm); HEART: (Four Chamber View, 3 Vessel Trachea,   Interventricular Septum);    STOMACH, RIGHT KIDNEY, LEFT KIDNEY, PLACENTA      Not Visualized:   HEART: (Proximal Left Outflow, Proximal Right Outflow)      IMPRESSION      Estrada IUP   33 weeks and 4 days by this ultrasound  (SIMONA=APR 30 2017)   33 weeks and 0 days by 1st Tri Sono  (SIMONA=MAY 4 2017)   Vertex presentation   Fetal growth appeared normal   Regular fetal heart rate of 128 bpm   Posterior, fundal placenta   No placenta previa      GENERAL COMMENT      No fetal structural abnormality is identified on the Level I survey today  Fetal interval growth and amniotic fluid volume are normal       Today's ultrasound findings and suggested follow-up were discussed in   detail with Iro  Daily third trimester fetal kick counting was discussed   at the visit today  No further appointment has been scheduled in the   [facility]  for [address] [place] [address] at this time  Follow-up M ultrasound   evaluation is recommended as clinically indicated  The face to face time, in addition to time spent discussing ultrasound   results, was approximately 10 minutes, greater than 50% of which was spent   during counseling and coordination of care  TREY Marina M D     Maternal-Fetal Medicine   Electronically signed 03/16/17 18:23

## 2018-01-11 NOTE — MISCELLANEOUS
Message  Keenan shipman Cartavi [phone] called 12/22/16, left message NIPT authorized [id number]  patient called, per patient she has a [facility] lab slip for this testing, explained to patient she still needs to contact her insurance to check if she needs to meet a co-pay or deductible, insurance approval does not always mean testing covered at 100%  Patient verbalized understanding will call her insurance and call [facility] [address]  [facility] back if she decides or if she declines testing  Active Problems    1  Elderly multigravida in second trimester (659 63) (O09 522)   2  Encounter for pregnancy related examination in second trimester (V22 1) (Z34 82)   3  Subchorionic hemorrhage, antepartum (656 83) (O46 8X9,O41 8X90)    Current Meds   1  Prenate Pixie 10-0 6-0 4-200 MG Oral Capsule; 1 Tab daily; Therapy: 91VMZ1064 to (Last Rx:04Oct2016) Ordered    Allergies    1  No Known Drug Allergies    2  No Known Environmental Allergies   3   No Known Food Allergies    Signatures   Electronically signed by : Lourdes Haq, ; Dec 23 2016  1:55PM EST                       (Author)

## 2018-01-13 VITALS — SYSTOLIC BLOOD PRESSURE: 102 MMHG | BODY MASS INDEX: 22.49 KG/M2 | DIASTOLIC BLOOD PRESSURE: 62 MMHG | WEIGHT: 131 LBS

## 2018-01-13 VITALS — SYSTOLIC BLOOD PRESSURE: 110 MMHG | BODY MASS INDEX: 25.61 KG/M2 | DIASTOLIC BLOOD PRESSURE: 70 MMHG | WEIGHT: 140 LBS

## 2018-01-14 VITALS
DIASTOLIC BLOOD PRESSURE: 55 MMHG | SYSTOLIC BLOOD PRESSURE: 107 MMHG | WEIGHT: 145 LBS | BODY MASS INDEX: 26.68 KG/M2 | HEIGHT: 62 IN

## 2018-01-14 VITALS — SYSTOLIC BLOOD PRESSURE: 102 MMHG | DIASTOLIC BLOOD PRESSURE: 62 MMHG | BODY MASS INDEX: 27.44 KG/M2 | WEIGHT: 150 LBS

## 2018-01-15 VITALS
WEIGHT: 128 LBS | DIASTOLIC BLOOD PRESSURE: 70 MMHG | BODY MASS INDEX: 23.55 KG/M2 | SYSTOLIC BLOOD PRESSURE: 110 MMHG | HEIGHT: 62 IN

## 2018-01-15 NOTE — PROGRESS NOTES
Assessment    1  Acute vaginitis (616 10) (N76 0)    Plan   Acute vaginitis    · MetroNIDAZOLE 0 75 % Vaginal Gel (MetroGel-Vaginal); INSERT 1  APPLICATORFUL INTRAVAGINALLY AT BEDTIME NIGHTLY   Rx By: Dennis Patten; Dispense: 5 Days ; #:1 X 70 GM Tube; Refill: 0; For: Acute vaginitis; NERY = N; Verified Transmission to [facility]/PHARMACY# [id number]; Last Updated By: System, [employer]; 1/27/2016 11:57:15 AM    (1) VAGINITIS/ VAGINOSIS, DNA ( AFFIRM); Status:Active; Requested QEA:[id number];   MUQ:[id number];GPYCSFT; For:Acute vaginitis; Ordered By:Radha Pearson; Discussion/Summary  Discussion Summary:   Recommend acidophilus probiotic daily or yogurt daily to help with symptoms  Office will call if results are abnormal       History of Present Illness  HPI: 31 yo seen for vaginal odor  Patient is 4 weeks pp  C/o fishy odor for the past 2 weeks  Denies itching or discharge, fever, chills or pelvic pain  Lochia gone  Denies bowel or bladder issues  Review of Systems   Female ROS: vaginal odor, but no pelvic pain, no pelvic pressure, no vaginal pain, no vaginal discharge, no vaginal itching, no vaginal lump or mass, no nonmenstrual bleeding, no vulvar pain, no vulvar itching, no vulvar lump or mass, no labial swelling, no dysuria, no bladder pain, no hematuria, no burning sensation during urination, no change in urinary frequency, no feelings of urinary urgency, no flank pain, no abnormal urethral discharge, urine is not foul-smelling, no urinary hesitancy and no urinary loss of control  Focused-Female:   Constitutional: No fever, no chills, feels well, no tiredness, no recent weight gain or loss  ENT: no ear ache, no loss of hearing, no nosebleeds or nasal discharge, no sore throat or hoarseness  Cardiovascular: no complaints of slow or fast heart rate, no chest pain, no palpitations, no leg claudication or lower extremity edema     Respiratory: no complaints of shortness of breath, no wheezing, no dyspnea on exertion, no orthopnea or PND  Breasts: no complaints of breast pain, breast lump or nipple discharge  Gastrointestinal: no complaints of abdominal pain, no constipation, no nausea or diarrhea, no vomiting, no bloody stools  Genitourinary: no complaints of dysuria, no incontinence, no pelvic pain, no dysmenorrhea, no vaginal discharge or abnormal vaginal bleeding  Musculoskeletal: no complaints of arthralgia, no myalgia, no joint swelling or stiffness, no limb pain or swelling  Integumentary: no complaints of skin rash or lesion, no itching or dry skin, no skin wounds  Neurological: no complaints of headache, no confusion, no numbness or tingling, no dizziness or fainting  Active Problems    1  Acute vaginitis (616 10) (N76 0)    Past Medical History    1  History of Well female exam with routine gynecological exam (V72 31) (Z01 419)    Family History    1  Family history of diabetes mellitus (V18 0) (Z83 3)    Social History    · Cultural background   ·    · Never a smoker   · Primary spoken language English    Current Meds   1  Prenatal TABS; Therapy: ([facility]) to Recorded    Allergies    1  No Known Drug Allergies    2  No Known Environmental Allergies   3  No Known Food Allergies    Vitals  Vital Signs [Data Includes: Current Encounter]    Recorded: 72ZLI8937 30:94KR   Systolic 381, RUE, Sitting   Diastolic 63, RUE, Sitting   Height 5 ft 2 in   Weight 136 lb    BMI Calculated 24 88   BSA Calculated 1 62     Physical Exam    Constitutional   General appearance: No acute distress, well appearing and well nourished  Pulmonary   Respiratory effort: No increased work of breathing or signs of respiratory distress  Auscultation of lungs: Clear to auscultation  Cardiovascular   Auscultation of heart: Normal rate and rhythm, normal S1 and S2, no murmurs  Peripheral vascular exam: Normal pulses Throughout      Genitourinary   External genitalia: Normal and no lesions appreciated  Vagina: Abnormal   thin clear discharge in vaginal vault, obvious fishy odor  Urethra: Normal     Urethral meatus: Normal     Bladder: Normal, soft, non-tender and no prolapse or masses appreciated  Cervix: Normal, no palpable masses  Uterus: Normal, non-tender, not enlarged, and no palpable masses  Adnexa/parametria: Normal, non-tender and no fullness or masses appreciated  Abdomen   Abdomen: Normal, non-tender, and no organomegaly noted  Liver and spleen: No hepatomegaly or splenomegaly  Examination for hernias: No hernias appreciated  Lymphatic   Palpation of lymph nodes in neck, axillae, groin and/or other locations: No lymphadenopathy or masses noted  Skin   Skin and subcutaneous tissue: Normal skin turgor and no rashes  Palpation of skin and subcutaneous tissue: Normal     Psychiatric   Orientation to person, place, and time: Normal     Mood and affect: Normal        Attending Note  Collaborating Physician Note: Collaborating Physician: I discussed the case with the Advanced Practitioner and reviewed the note and I agree with the Advanced Practitioner note        Future Appointments    Date/Time Provider Specialty Site   06/06/2016 04:00 PM Beto Huynh MD Obstetrics/Gynecology [address] OBGYN     Signatures   Electronically signed by : Mingo Bose [facility]; Feb 1 2016 10:14PM EST                       (Author)    Electronically signed by : Mikey Fraser MD; Feb  3 2016 10:19AM EST                       (Author)

## 2018-01-15 NOTE — PROGRESS NOTES
Preliminary Nursing Report                Patient Information    Initial Encounter Entry Date:   2017 10:54 AM EST (Automated Transmission Automated Transmission)       Last Modified:   {Li Leong}              Legal Name: Elena Macias Number:        YOB: 1980        Age (years): 40        Gender: F        Body Mass Index (BMI): 27 kg/m2        Height: 62 in  Weight: 147 lbs (67 kgs)           Address:   [address] [place] [address]              Phone: [phone]   (consent to leave messages)        Email:        Ethnicity: Decline to State        Jewish:        Marital Status:        Preferred Language: English        Race: Other Race                    Patient Insurance Information        Primary Insurance Information Carrier Name: {Primary  CarrierName}           Carrier Address:   {Primary  CarrierAddress}              Carrier Phone: {Primary  CarrierPhone}          Group Number: {Primary  GroupNumber}          Policy Number: {Primary  PolicyNumber}          Insured Name: {Primary  InsuredName}          Insured : {Primary  InsuredDOB}          Relationship to Insured: {Primary  RelationshiptoInsured}           Secondary Insurance Information Carrier Name: {Secondary  CarrierName}           Carrier Address:   {Secondary  CarrierAddress}              Carrier Phone: {Secondary  CarrierPhone}          Group Number: {Secondary  GroupNumber}          Policy Number: {Secondary  PolicyNumber}          Insured Name: {Secondary  InsuredName}          Insured : {Secondary  InsuredDOB}          Relationship to Insured: {Secondary  RelationshiptoInsured}                       Health Profile   Booking #:   Jamar Marlow #: [id number]               DOS: 2017    Surgery : Laparoscopy, surgical; with occlusion of oviducts by device (eg, band, clip, or Falope ring)    Add'l Procedures/Notes:     Surgery Risk: Intermediate          Precautions Allergies    No Known Drug Allergies       No Known Environmental Allergies       No Known Food Allergies       Clinical Comments: Reaction Type: , Reaction: , Severity:              Medications    Vitafol Ultra 29-0 6-0 4-200 MG Oral Capsule               Conditions    Elderly multigravida, third trimester       Encounter for pregnancy related examination in third trimester       Subchorionic hemorrhage, antepartum               Family History    None             Surgical History    None             Social History    Cultural background       Clinical Comments: ;               Never a smoker       No alcohol use       No drug use       Primary spoken language English                               Patient Instructions       Medical Procedure Risk  NPO Instructions   The day before surgery it is recommended to have a light dinner at your usual time and you are allowed a light snack early in the evening  Do not eat anything heavy or eat a big meal after 7pm  Do not eat or drink anything after midnight prior to your surgery  If you are supposed to take any of your medications, do so with a sip of water  Failure to follow these instructions can lead to an increased risk of lung complications and may result in a delay or cancellation of your procedure  If you have any questions, contact your institution for further instructions  No candy, no gum, no mints, no chewing tobacco                Testing Considerations       ? Complete Blood Count (CBC) t, client, client  If test was completed and normal within last six months, repeat test is not necessary  Triggered by: Age or Facility Rec         ? Type and Screen client  Type and Screen - Blood: If there is anticipated or possible large blood loss with this procedure, then a Type and Screen for Blood should be ordered  Triggered by: Age or Facility Rec               Consultations       ?  Pregnancy   Patient should be evaluated by OB/GYN in order to determine readiness and optimal timing of procedure  Special precautions may also be required, including fetal heart monitoring  Triggered by: Subchorionic hemorrhage, antepartum, Elderly multigravida, third trimester               Miscellaneous Questions         Question: Are you able to walk up a flight of stairs, walk up a hill or do heavy housework WITHOUT having chest pain or shortness of breath? Answer: YES                   Allergies/Conditions/Medications Not Found        The following were not recognized by our system when generating the recommendations  Please consider if this would impact any preoperative protocols  ? Cultural background       ?        ? Never a smoker       ? No alcohol use       ? No drug use       ? Primary spoken language English       ? Vitafol Ultra 29-0 6-0 4-200 MG Oral Capsule                  Appointment Information         Date:    06/12/2017        Location:    [facility]        Address:           Directions:                      Footnotes revision 14      ?? Denotes a free-text entry  Legal Disclaimer: Any and all recommendations and services provided herein are designed to assist in the preoperative care of the patient  Nothing contained herein is designed to replace, eliminate or alleviate the responsibility of the attending physician to supervise and determine the patient?s preoperative care and course of treatment  Failure to provide complete, accurate information may negatively impact the system?s ability to recommend the proper preoperative protocol  THE ATTENDING PHYSICIAN IS RESPONSIBLE TO REVIEW THE SUGGESTED PREOPERATIVE PROTOCOLS/COURSE OF TREATMENT AND PRESCRIBE THE FINAL COURSE OF PREOPERATIVE TREATMENT IN CONSULTATION WITH THE PATIENT  THE [employer] SYSTEM AND ITS MATERIALS ARE PROVIDED ? AS IS? WITHOUT WARRANTY OF ANY KIND, EXPRESS OR IMPLIED, INCLUDING, BUT NOT LIMITED TO, WARRANTIES OF PERFORMANCE OR MERCHANTABILITY OR FITNESS FOR A PARTICULAR PURPOSE  PATIENT AND PHYSICIANS HEREBY AGREE THAT THEIR USE OF THE MATERIALS AND RESOURCES ACT AS A CONSENT TO RELEASE AND WAIVE ePREOP FROM ANY AND ALL CLAIMS OF WARRANTY, TORT OR CONTRACT LAW OF ANY KIND  Electronically signed Renan HERNANDEZ    Jun 5 2017  1:59PM EST

## 2018-01-17 NOTE — PROGRESS NOTES
DEC 15 2016         RE: Lovell Cabot                                     To: [facility]   MR#: [id number]                                   [address]   [facility] [address] [facility]   ENC: [id number]                             Fax: [phone]   (Exam #: [id number])      The LMP of this 29year old,  G5, P2-0-3-2 patient was 2016, her   working SIMONA is MAY 4 2017 and the current gestational age is 25 weeks 0   days by 1st Trimester Sono  A sonographic examination was performed on DEC   15 2016 using real time equipment  The ultrasound examination was   performed using abdominal & vaginal techniques  The patient has a BMI of   24 5  Her blood pressure today was 105/64  Earliest ultrasound found in her record:   16    6w0d  17 SIMONA A moderate subchorionic hemorrhage was noted  Radiology scan with a very small fetal pole seen with a heart rate noted  10/04/16 10w3d  17 SIMONA Ob scan   This was concordant with her SIMONA by   LMP   16  15w6d 17 SIMONA MFM scan      Cardiac motion was observed at 145 bpm       INDICATIONS      fetal anatomical survey   advanced maternal age      Exam Types      LEVEL II   Transvaginal      RESULTS      Fetus # 1 of 1   Vertex presentation   Fetal growth appeared normal   Placenta Location = Posterior   No placenta previa   Placenta Grade = 0      MEASUREMENTS (* Included In Average GA)      AC              15 2 cm        20 weeks 1 day * (54%)   BPD              4 7 cm        20 weeks 1 day * (57%)   HC              17 9 cm        20 weeks 2 days* (55%)   Femur            3 4 cm        20 weeks 6 days* (57%)      Nuchal Fold      4 3 mm   NBL              7 2 mm      Humerus          3 4 cm        21 weeks 3 days  (83%)      Cerebellum       2 1 cm        20 weeks 6 days   Biorbit          3 3 cm        21 weeks 1 day   CisternaMagna    6 8 mm      HC/AC           1 18   FL/AC 0 22   FL/BPD          0 72   EFW (Ac/Fl/Hc)   357 grams - 0 lbs 13 oz      THE AVERAGE GESTATIONAL AGE is 20 weeks 3 days +/- 10 days  AMNIOTIC FLUID         Largest Vertical Pocket = 4 4 cm   Amniotic Fluid: Normal      CERVICAL EVALUATION      The cervix appeared normal (Ultrasound Examination)  SUPINE      Cervical Length: 4 40 cm      ANATOMY      Head                                    Normal   Face/Neck                               Normal   Th  Cav  Normal   Heart                                   Normal   Abd  Cav  Normal   Stomach                                 Normal   Right Kidney                            Normal   Left Kidney                             Normal   Bladder                                 Normal   Abd  Wall                               Normal   Spine                                   Normal   Extrems                                 Normal   Genitalia                               Normal   Placenta                                Normal   Umbl  Cord                              Normal   Uterus                                  Normal   PCI                                     Normal      ANATOMY DETAILS      Visualized Appearing Sonographically Normal:   HEAD: (Calvarium, BPD Level, Cavum, Lateral Ventricles, Choroid Plexus,   Cerebellum, Cisterna Magna);    FACE/NECK: (Neck, Nuchal Fold, Profile,   Orbits, Nose/Lips, Palate, Face);    TH  CAV  : (Lungs, Diaphragm); HEART: (Four Chamber View, Proximal Left Outflow, Proximal Right Outflow,   3VV, 3 Vessel Trachea, Short Axis of Greater Vessels, Ductal Arch, Aortic   Arch, Interventricular Septum, Interatrial Septum, IVC, SVC, Cardiac Axis,   Cardiac Position);    ABD  CAV : (Liver);    STOMACH, RIGHT KIDNEY, LEFT   KIDNEY, BLADDER, ABD   WALL, SPINE: (Cervical Spine, Thoracic Spine, Lumbar   Spine, Sacrum);    EXTREMS: (Lt Humerus, Rt Humerus, Lt Forearm, Rt   Forearm, Lt Hand, Rt Hand, Lt Femur, Rt Femur, Lt Low Leg, Rt Low Leg, Lt   Foot, Rt Foot);    GENITALIA (Male), PLACENTA, UMBL  CORD, UTERUS, PCI      ADNEXA      The left ovary appeared normal and measured 0 8 x 1 1 x 1 7 cm with a   volume of 0 8 cc  The right ovary appeared normal and measured 2 1 x 1 5 x   1 1 cm with a volume of 1 8 cc  IMPRESSION      Estrada IUP   20 weeks and 3 days by this ultrasound  (SIMONA=MAY 1 2017)   20 weeks and 0 days by 1st Tri Sono  (SIMONA=MAY 4 2017)   Vertex presentation   Fetal growth appeared normal   Normal anatomy survey   Regular fetal heart rate of 145 bpm   Posterior placenta   No placenta previa      GENERAL COMMENT      OFFICE VISIT      On exam today the patient appears well, in no acute distress, and denies   any complaints  Her abdomen is non-tender  The patient has declined genetic screening, and we discussed that a normal   ultrasound does not exclude all congenital birth defects or karyotypic   abnormalities  The fetal anatomic survey is complete  There is no sonographic evidence   of fetal abnormalities at this time  Good fetal movement and tone are   seen  The amniotic fluid volume appears normal   The placenta is   posterior and it appears sonographically normal   A transvaginal   ultrasound was performed to assess the cervix, which was not seen well   transabdominally  The cervical length was 4 4 centimeters, which is   normal for the current gestational age  There was no significant   funneling or dynamic changes appreciated  The patient was informed of   today's findings and all of her questions were answered  The limitations   of ultrasound were reviewed with the patient, which she accepts  We discussed appropriate follow-up in detail and I recommend the patient   return in 12 weeks for a fetal growth evaluation for the indication of   advanced maternal age        Please note, in addition to the time spent discussing the results of the ultrasound, I spent approximately 10 minutes of face-to-face time with the   patient, greater than 50% of which was spent in counseling and the   coordination of care for this patient  Thank you very much for allowing us to participate in the care of this   very nice patient  Should you have any questions, please do not hesitate   to contact our office  TREY Coles , SREEDHAR Acosta     Electronically signed 12/27/16 08:52

## 2018-01-18 NOTE — PROGRESS NOTES
2016         RE: Barrera Masterson                                     To: [facility]   MR#: [id number]                                   [address]   :  [facility], [address] [facility]   ENC: [id number]                             Fax: [phone]   (Exam #: [id number])      The LMP of this 29year old,  G5, P2-*-3-2 patient was 2016, giving   her an SIMONA of 2017 and a current gestational age of 12 weeks 3 days   by dates  A sonographic examination was performed on 2016 using   real time equipment  The ultrasound examination was performed using   abdominal technique  The patient has a BMI of 22 9  Her blood pressure   today was 91/54  Earliest ultrasound found in her record:   16    6w0d  17 SIMONA A moderate subchorionic hemorrhage was noted  Radiology scan with a very small fetal pole seen with a heart rate noted  10/04/16 10w3d  17 SIMONA Ob scan  This was concordant with her SIMONA by   LMP   16  15w6d 17 SIMONA MFM scan   Iro is on prenatal vitamins and denies any allergies to medications  She   denies any use of cigarettes, alcohol or illicit drugs  She also denies   any significant past medical, past surgical or any first generation family   history of hypertension, diabetes, thrombosis or congenital anomalies  Her   prior pregnancy history that she reports on her paperwork is to vaginal   deliveries that weighed between 6 lbs  4 oz  and 6 lbs  8 oz  She has been   seen by our office in the past though when at which time she reported she   had 3 prior spontaneous abortions that she did not mention on today's   visit and on today's visit she is with the father the baby  She is   interested in a flu shot and genetic screening today  Her prior ob records   from  also record 3 sabs, while her EMR today only records her    sab        Cardiac motion was observed at 157 bpm       INDICATIONS      early fetal anatomy and growth      Exam Types      Level I      RESULTS      Fetus # 1 of 1   Variable presentation   Fetal growth appeared normal   Placenta Location = Posterior   No placenta previa   Placenta Grade = I      MEASUREMENTS (* Included In Average GA)      AC               9 9 cm        15 weeks 4 days* (42%)   BPD              3 4 cm        16 weeks 4 days*   HC              12 0 cm        15 weeks 6 days*   Femur            1 9 cm        15 weeks 3 days*      Cerebellum       1 5 cm        16 weeks 2 days      HC/AC           1 21   FL/AC           0 19   FL/BPD          0 55   EFW (Ac/Fl/Hc)   133 grams - 0 lbs 5 oz      THE AVERAGE GESTATIONAL AGE is 15 weeks 6 days +/- 7 days  AMNIOTIC FLUID         Largest Vertical Pocket = 4 0 cm   Amniotic Fluid: Normal      ANATOMY COMMENTS      Anatomic detail is limited at this early gestational age  The fetal   cranium appeared normal in shape and the nuchal fold was normal in   appearance  The fetal face appears normal  The nasal bone appears to be   present  The intracranial anatomy was unremarkable  Anatomy of the   fetal thorax appeared within normal limits  The cardiac rhythm was   regular  A four chamber heart and both outflow tracts are seen with the   help of color doppler and the cardiac axis appears normal  Within the   abdomen, diaphragm, stomach & bladder were visualized and the abdominal   wall appeared intact  A three vessel cord appears to be present  Active   movement of the fetal body & 4 extremities was seen  The genitalia appear   normal   There is no suspicion of a subchorionic hematoma  The placental   cord insertion appears normal  The culdesac was reviewed and no free fluid   was appreciated  The cervix was evaluated transabdominally and appears   normal measuring 30mm  There is no evidence of spontaneous funneling  ADNEXA      The left ovary appeared normal and measured 3 2 x 2 2 x 1 6 cm with a   volume of 5 9 cc   The right ovary was not visualized  IMPRESSION      Estrada IUP   15 weeks and 6 days by this ultrasound  (SIMONA=APR 29 2017)   Variable presentation   Fetal growth appeared normal   Regular fetal heart rate of 157 bpm   Posterior placenta   No placenta previa      GENERAL COMMENT      As per your request, a consultation was performed on your patient for the   following indication: AMA      Risks:   1  AMA- with a 0 78 % risk for any type of aneuploidy at the time of an   amniocentesis  We discussed the following options for genetic screening  She understands that US is not a good screen for aneuploidy as it may miss   up to 50% of the babies with a chromosome problem  She understands that a Quad Screen has a 5% false positive rate and may   miss 20% of the babies with Downs syndrome or Trisomy 18 and that it is   not a great screen for other chromosome problems which her age puts her at   risk for  She understands that Cell free DNA screening can  99% of babies   with Downs and 97% of babies with T18  The false positive rate for Downs   and T18 if found is 0 1%  The test also can  7/11 babies with T13   with false positive rate of 0 2%  It is not a great screen for other   chromosome problems which her age puts her at risk for  This test is new   and is not always covered by insurance  Lastly she was offered invasive genetic testing  Options for prenatal   diagnosis discussed included an amniocentesis  Risks and benefits of an   amniocentesis were reviewed including an increased risk of loss of 0 5%   over the baseline risk  It was explained that normal chromosomes and a   normal ultrasound do not guarantee a normal baby nor a normal pregnancy   outcome  The patient was informed of the findings and counseled about the   limitations of the exam in detecting all forms of fetal congenital   abnormalities  She denies any vaginal bleeding or uterine cramping/contractions  Exam shows she is comfortable and her abdomen is nontender  Patient verbalizes understanding and declines having an amniocentesis at   todays visit  She is planning on having Cell free DNA testing done if   covered by insurance  Her insurance requires a preauthorization which will   be started today  Follow up recommended:   1  Recommend the MSAFP test locally if she has the Cell free DNA testing   done as this test does not screen for spinal defects  2  She was counseled regarding and received the flu shot today  3  Recommend a follow up 20 week anatomy scan and a 32 week growth scan   due to her age  4  Please review her dating  Her 6 week ultrasound would have changed her   dates and the follow-up 10 week and todays scan would be concordant with   both her 6 week scan and her lmp  SInce the radiology scan by my review   appears accurate I would suggest going by this SIMONA  The face to face time, in addition to time spent discussing ultrasound   results, was approximately 15 minutes, greater than 50% of which was spent   during counseling and coordination of care  TREY Luna M D     Maternal-Fetal Medicine   Electronically signed 11/12/16 12:59

## 2018-01-22 VITALS
HEIGHT: 62 IN | SYSTOLIC BLOOD PRESSURE: 110 MMHG | WEIGHT: 126 LBS | BODY MASS INDEX: 23.19 KG/M2 | DIASTOLIC BLOOD PRESSURE: 60 MMHG

## 2018-01-22 VITALS — WEIGHT: 147 LBS | BODY MASS INDEX: 26.89 KG/M2 | DIASTOLIC BLOOD PRESSURE: 70 MMHG | SYSTOLIC BLOOD PRESSURE: 112 MMHG

## 2018-01-22 VITALS — BODY MASS INDEX: 25.79 KG/M2 | SYSTOLIC BLOOD PRESSURE: 130 MMHG | WEIGHT: 141 LBS | DIASTOLIC BLOOD PRESSURE: 80 MMHG

## 2018-01-22 VITALS — SYSTOLIC BLOOD PRESSURE: 110 MMHG | BODY MASS INDEX: 27.98 KG/M2 | WEIGHT: 153 LBS | DIASTOLIC BLOOD PRESSURE: 62 MMHG

## 2018-01-22 VITALS — WEIGHT: 144 LBS | BODY MASS INDEX: 26.34 KG/M2 | DIASTOLIC BLOOD PRESSURE: 82 MMHG | SYSTOLIC BLOOD PRESSURE: 128 MMHG

## 2018-01-23 VITALS
BODY MASS INDEX: 23.19 KG/M2 | HEIGHT: 62 IN | DIASTOLIC BLOOD PRESSURE: 60 MMHG | WEIGHT: 126 LBS | SYSTOLIC BLOOD PRESSURE: 100 MMHG

## 2018-03-07 NOTE — PROGRESS NOTES
Education  [facility] Medical Education 2nd Trimester:   Second Trimester Education provided:   benefits of breastfeeding, importance of exclusive breastfeeding, early initiation of breastfeeding, exclusive breastfeeding for the first 6 months, non-pharmacologic pain relief methods for labor, rooming-in on 24 hour basis, Pregnancy Essentials Reference Guide given and 28 week packet given  Mother has not registered for prenatal class  Thought has been given to selecting a pediatrician  The mother has discussed personal preferences with her provider     Prenatal education provided by: Taylor Dennis PA-C      Signatures   Electronically signed by : Mingo Bose, [facility]; Feb 1 2017  6:21PM EST                       (Author)

## 2018-04-26 ENCOUNTER — TELEPHONE (OUTPATIENT)
Dept: OBGYN CLINIC | Facility: CLINIC | Age: 38
End: 2018-04-26

## 2018-08-22 ENCOUNTER — TELEPHONE (OUTPATIENT)
Dept: OBGYN CLINIC | Facility: HOSPITAL | Age: 38
End: 2018-08-22

## 2018-08-24 ENCOUNTER — HOSPITAL ENCOUNTER (OUTPATIENT)
Dept: RADIOLOGY | Facility: HOSPITAL | Age: 38
Discharge: HOME | End: 2018-08-24
Attending: ORTHOPAEDIC SURGERY
Payer: COMMERCIAL

## 2018-08-24 ENCOUNTER — OFFICE VISIT (OUTPATIENT)
Dept: OBGYN CLINIC | Facility: HOSPITAL | Age: 38
End: 2018-08-24
Payer: COMMERCIAL

## 2018-08-24 VITALS
HEART RATE: 91 BPM | SYSTOLIC BLOOD PRESSURE: 100 MMHG | WEIGHT: 131 LBS | HEIGHT: 62 IN | BODY MASS INDEX: 24.11 KG/M2 | DIASTOLIC BLOOD PRESSURE: 64 MMHG

## 2018-08-24 DIAGNOSIS — S39.012A LUMBOSACRAL STRAIN, INITIAL ENCOUNTER: Primary | ICD-10-CM

## 2018-08-24 DIAGNOSIS — M25.552 PAIN IN LEFT HIP: ICD-10-CM

## 2018-08-24 PROCEDURE — 73502 X-RAY EXAM HIP UNI 2-3 VIEWS: CPT

## 2018-08-24 PROCEDURE — 99243 OFF/OP CNSLTJ NEW/EST LOW 30: CPT | Performed by: ORTHOPAEDIC SURGERY

## 2018-08-24 RX ORDER — NAPROXEN 500 MG/1
250 TABLET ORAL 2 TIMES DAILY WITH MEALS
COMMUNITY
End: 2018-09-05

## 2018-08-24 RX ORDER — NAPROXEN 500 MG/1
500 TABLET ORAL 2 TIMES DAILY WITH MEALS
Qty: 60 TABLET | Refills: 1 | Status: SHIPPED | OUTPATIENT
Start: 2018-08-24 | End: 2018-09-05 | Stop reason: SDUPTHER

## 2018-08-24 RX ORDER — DIAZEPAM 5 MG/1
5 TABLET ORAL EVERY 6 HOURS PRN
COMMUNITY
End: 2020-07-23 | Stop reason: ALTCHOICE

## 2018-08-24 NOTE — PATIENT INSTRUCTIONS
Continue naproxen 2x/day -  it was explained that this may take a couple weeks to fully work  Formal physical therapy for ROM and stretching  Ice for 15 min 4x/day if needed

## 2018-08-24 NOTE — LETTER
August 24, 2018     Patient: Laly Boateng   YOB: 1980   Date of Visit: 8/24/2018       To Whom it May Concern:    Maco Glass is under my professional care  She was seen in my office on 8/24/2018  Please excuse patient from work to make her medical appointment today  If you have any questions or concerns, please don't hesitate to call           Sincerely,          Trae Reese MD        CC: No Recipients

## 2018-08-24 NOTE — LETTER
August 24, 2018     Onel Thorpe MD  [address]    Patient: Victorina Acuña   YOB: 1980   Date of Visit: 8/24/2018       Dear Dr Adryan Arriaga: Thank you for referring Bill Ferreira to me for evaluation  Below are my notes for this consultation  If you have questions, please do not hesitate to call me  I look forward to following your patient along with you  Sincerely,        Alex Dallas MD        CC: No Recipients  Ramilaciara Bairesjon Clarisa Marlen  8/24/2018  3:57 PM  Sign at close encounter  Chief Complaint   Patient presents with    Left Leg - Pain    Left Thigh - Pain           Assessment:  Lumbosacral strain and left hip pain    Plan :  Continue naproxen 2x/day - it was explained that this may take a couple weeks to fully work  Formal physical therapy for ROM and stretching  Ice for 15 min 4x/day if needed  HPI:   Patient is a 42 y/o female who presents today with complaints of left buttock pain  This has been present for over a month, but worsening the past 1-2 weeks  Pain can radiate past the knee, but not to the foot  Denies n/t  Positive nocturnal symptoms, no loss of bowels/bladder  Has not tried therapy  Has started naproxen and valium without relief       PMHx:         Past Medical History:   Diagnosis Date    Abnormal Pap smear of cervix     Anemia     Hemorrhoids in pregnancy     PONV (postoperative nausea and vomiting)     ONLY WITH EPIDURAL    Recent childbirth     BABY IS 9WEEKS OLD    Varicella     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, wears glasses       Past Surgical History:   Procedure Laterality Date    CERVICAL BIOPSY      DILATION AND CURETTAGE OF UTERUS      LAPAROSCOPIC TUBAL LIGATION Bilateral 6/12/2017    Procedure: LIGATION/COAGULATION TUBAL LAPAROSCOPIC;  Surgeon: Onel Thorpe MD;  Location: [facility];  Service: Gynecology       Family History   Problem Relation Age of Onset    Diabetes Sister     Hypertension Other        Social History     Social History    Marital status: /Civil Union     Spouse name: N/A    Number of children: N/A    Years of education: N/A     Occupational History    Not on file  Social History Main Topics    Smoking status: Never Smoker    Smokeless tobacco: Never Used    Alcohol use No    Drug use: No    Sexual activity: Not on file     Other Topics Concern    Not on file     Social History Narrative    No narrative on file       Current Outpatient Prescriptions   Medication Sig Dispense Refill    diazepam (VALIUM) 5 mg tablet Take 5 mg by mouth every 6 (six) hours as needed for anxiety      ibuprofen (MOTRIN) 600 mg tablet Take 1 tablet by mouth every 6 (six) hours as needed for mild pain 30 tablet 0    naproxen (NAPROSYN) 500 mg tablet Take 250 mg by mouth 2 (two) times a day with meals      Prenatal Vit-Iron Carbonyl-FA (PRENATAL MULTIVITAMIN) TABS Take 1 tablet by mouth daily       No current facility-administered medications for this visit  Allergies: Patient has no known allergies  ROS:  The patient is positive for the above orthopedic symptoms as well as thirsty  The remaining 10/12 systems on her intake sheet were negative  PE:  /64 (BP Location: Left arm, Patient Position: Sitting, Cuff Size: Adult)   Pulse 91   Ht 5' 2" (1 575 m)   Wt 59 4 kg (131 lb)   BMI 23 96 kg/m²    Constitutional: The patient was  oriented to person, place, and time  Well-developed and well-nourished  In no acute distress  HEENT: Vision intact  Hearing normal  Swallowing normal   Head: Normocephalic  Cardiovascular: Intact distal pulses  Pulse regular  Pulmonary/Chest: Effort normal  No respiratory distress  Neurological: Alert and oriented to person, place, and time  Skin: Skin is warm  Psychiatric: Normal mood and affect  Ortho Exam:  Patellar reflexes b/l  No weakness of great toe extension  Negative sitting root test  Pain with hip flexion and rotation       Studies reviewed: Xrays personally reviewed from [facility] that show loss of lordosis of the lumbar spine, but otherwise no acute fractures or signs of degeneration   Xrays taken today of the hip were personally reviewed and are normal

## 2018-08-24 NOTE — PROGRESS NOTES
Chief Complaint   Patient presents with    Left Leg - Pain    Left Thigh - Pain           Assessment:  Lumbosacral strain and left hip pain    Plan :  Continue naproxen 2x/day - it was explained that this may take a couple weeks to fully work  Formal physical therapy for ROM and stretching  Ice for 15 min 4x/day if needed  HPI:   Patient is a 42 y/o female who presents today with complaints of left buttock pain  This has been present for over a month, but worsening the past 1-2 weeks  Pain can radiate past the knee, but not to the foot  Denies n/t  Positive nocturnal symptoms, no loss of bowels/bladder  Has not tried therapy  Has started naproxen and valium without relief  PMHx:         Past Medical History:   Diagnosis Date    Abnormal Pap smear of cervix     Anemia     Hemorrhoids in pregnancy     PONV (postoperative nausea and vomiting)     ONLY WITH EPIDURAL    Recent childbirth     BABY IS 9WEEKS OLD    Varicella     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, wears glasses       Past Surgical History:   Procedure Laterality Date    CERVICAL BIOPSY      DILATION AND CURETTAGE OF UTERUS      LAPAROSCOPIC TUBAL LIGATION Bilateral 6/12/2017    Procedure: LIGATION/COAGULATION TUBAL LAPAROSCOPIC;  Surgeon: Aguilar Nieto MD;  [facility];  Service: Gynecology       Family History   Problem Relation Age of Onset    Diabetes Sister     Hypertension Other        Social History     Social History    Marital status: /Civil Union     Spouse name: N/A    Number of children: N/A    Years of education: N/A     Occupational History    Not on file       Social History Main Topics    Smoking status: Never Smoker    Smokeless tobacco: Never Used    Alcohol use No    Drug use: No    Sexual activity: Not on file     Other Topics Concern    Not on file     Social History Narrative    No narrative on file       Current Outpatient Prescriptions   Medication Sig Dispense Refill    diazepam (VALIUM) 5 mg tablet Take 5 mg by mouth every 6 (six) hours as needed for anxiety      ibuprofen (MOTRIN) 600 mg tablet Take 1 tablet by mouth every 6 (six) hours as needed for mild pain 30 tablet 0    naproxen (NAPROSYN) 500 mg tablet Take 250 mg by mouth 2 (two) times a day with meals      Prenatal Vit-Iron Carbonyl-FA (PRENATAL MULTIVITAMIN) TABS Take 1 tablet by mouth daily       No current facility-administered medications for this visit  Allergies: Patient has no known allergies  ROS:  The patient is positive for the above orthopedic symptoms as well as thirsty  The remaining 10/12 systems on her intake sheet were negative  PE:  /64 (BP Location: Left arm, Patient Position: Sitting, Cuff Size: Adult)   Pulse 91   Ht 5' 2" (1 575 m)   Wt 59 4 kg (131 lb)   BMI 23 96 kg/m²   Constitutional: The patient was  oriented to person, place, and time  Well-developed and well-nourished  In no acute distress  HEENT: Vision intact  Hearing normal  Swallowing normal   Head: Normocephalic  Cardiovascular: Intact distal pulses  Pulse regular  Pulmonary/Chest: Effort normal  No respiratory distress  Neurological: Alert and oriented to person, place, and time  Skin: Skin is warm  Psychiatric: Normal mood and affect  Ortho Exam:   No swelling, redness, or ecchymosis over back  Tender left SI joint and above sciatic notch  Not midline or on right side  Good lateral bend each side  Some pain on maximum forward flexion  Patellar and Achilles reflexes full and equal bilaterally  No weakness of great toe extension  Negative sitting root test  Pain with Left hip flexion and rotation  Studies reviewed: Xrays personally reviewed from [facility] that show loss of lordosis of the lumbar spine, but otherwise no acute fractures or signs of degeneration   Xrays taken today of the hip were personally reviewed and are normal   There is no collapse, lytic areas, soft tissue calcification seen

## 2018-09-04 ENCOUNTER — TELEPHONE (OUTPATIENT)
Dept: OBGYN CLINIC | Facility: HOSPITAL | Age: 38
End: 2018-09-04

## 2018-09-04 NOTE — TELEPHONE ENCOUNTER
Patient sees Dr Trey Glover  cb  [phone]    Patient is calling because her pharmacy does not carry naproxen  Patient would like to know if you could send it to another pharmacy that is not a [facility]  Patient is asking for possibly [facility] on [address] or [address] in [place]  Please let the patient know when this is taken care of

## 2018-09-05 ENCOUNTER — EVALUATION (OUTPATIENT)
Dept: PHYSICAL THERAPY | Facility: CLINIC | Age: 38
End: 2018-09-05
Payer: COMMERCIAL

## 2018-09-05 DIAGNOSIS — S39.012A LUMBOSACRAL STRAIN, INITIAL ENCOUNTER: ICD-10-CM

## 2018-09-05 DIAGNOSIS — G89.29 CHRONIC LEFT-SIDED LOW BACK PAIN WITHOUT SCIATICA: ICD-10-CM

## 2018-09-05 DIAGNOSIS — M54.50 CHRONIC LEFT-SIDED LOW BACK PAIN WITHOUT SCIATICA: ICD-10-CM

## 2018-09-05 DIAGNOSIS — M25.552 PAIN IN LEFT HIP: Primary | ICD-10-CM

## 2018-09-05 DIAGNOSIS — M25.552 PAIN IN LEFT HIP: ICD-10-CM

## 2018-09-05 PROCEDURE — 97161 PT EVAL LOW COMPLEX 20 MIN: CPT

## 2018-09-05 PROCEDURE — G8979 MOBILITY GOAL STATUS: HCPCS

## 2018-09-05 PROCEDURE — 97112 NEUROMUSCULAR REEDUCATION: CPT | Performed by: PHYSICAL MEDICINE & REHABILITATION

## 2018-09-05 PROCEDURE — G8978 MOBILITY CURRENT STATUS: HCPCS

## 2018-09-05 RX ORDER — NAPROXEN 500 MG/1
500 TABLET ORAL 2 TIMES DAILY WITH MEALS
Qty: 60 TABLET | Refills: 0 | Status: SHIPPED | OUTPATIENT
Start: 2018-09-05 | End: 2020-07-23 | Stop reason: ALTCHOICE

## 2018-09-05 NOTE — PROGRESS NOTES
I sent the Naprosyn to the [facility] pharmacy on [address] as requested

## 2018-09-10 ENCOUNTER — OFFICE VISIT (OUTPATIENT)
Dept: PHYSICAL THERAPY | Facility: CLINIC | Age: 38
End: 2018-09-10
Payer: COMMERCIAL

## 2018-09-10 DIAGNOSIS — M25.552 PAIN IN LEFT HIP: Primary | ICD-10-CM

## 2018-09-10 DIAGNOSIS — M54.50 CHRONIC LEFT-SIDED LOW BACK PAIN WITHOUT SCIATICA: ICD-10-CM

## 2018-09-10 DIAGNOSIS — G89.29 CHRONIC LEFT-SIDED LOW BACK PAIN WITHOUT SCIATICA: ICD-10-CM

## 2018-09-10 PROCEDURE — 97112 NEUROMUSCULAR REEDUCATION: CPT | Performed by: PHYSICAL MEDICINE & REHABILITATION

## 2018-09-10 PROCEDURE — 97140 MANUAL THERAPY 1/> REGIONS: CPT | Performed by: PHYSICAL MEDICINE & REHABILITATION

## 2018-09-10 PROCEDURE — 97110 THERAPEUTIC EXERCISES: CPT | Performed by: PHYSICAL MEDICINE & REHABILITATION

## 2018-09-10 NOTE — PROGRESS NOTES
Daily Note     Today's date: 9/10/2018  Patient name: Emigdio Reveles  : 1980  MRN: [id number]  Referring provider: Raul Varghese MD  Dx:   Encounter Diagnosis     ICD-10-CM    1  Pain in left hip M25 552    2  Chronic left-sided low back pain without sciatica M54 5     G89 29        Start Time: 1602  Stop Time: 1701  Total time in clinic (min): 59 minutes    Subjective: Iro reports being in 10 at the beginning of treatment session  Pt said their hip "was feeling fine the past few days, but started to hurt yesterday " Pt reports 1/10 pain following treatment session  Objective: See treatment diary below  Precautions: none  Daily Treatment Diary     Manual   9/10/18           Lateral hip distraction w/belt  10 min           Long axis hip distraction  5 min           Quad/hip flex stretch                                           Exercise Diary  2018            Funmilayo*             TrA*             Prone press ups*  2x15           Hip abduction standing             squats             bike  5 min                                                                                                                                                                                    HEP teaching and return demonstration 15min 10min               Modalities                                                         Assessment: Tolerated treatment well  Patient would benefit from continued PT  Pt showed pain decrease following manual therapy and prone press ups from 4/10 to 1/10  PT provided overpressure with prone press up movement to increase ROM and decrease pain  PT will continue to progress pt to goals  Plan: Continue per plan of care  Pt will continue HEP to limit pain  PT will continue manual therapy to increase ROM and decrease pain

## 2018-09-13 ENCOUNTER — OFFICE VISIT (OUTPATIENT)
Dept: PHYSICAL THERAPY | Facility: CLINIC | Age: 38
End: 2018-09-13
Payer: COMMERCIAL

## 2018-09-13 DIAGNOSIS — M54.50 CHRONIC LEFT-SIDED LOW BACK PAIN WITHOUT SCIATICA: ICD-10-CM

## 2018-09-13 DIAGNOSIS — G89.29 CHRONIC LEFT-SIDED LOW BACK PAIN WITHOUT SCIATICA: ICD-10-CM

## 2018-09-13 DIAGNOSIS — M25.552 PAIN IN LEFT HIP: Primary | ICD-10-CM

## 2018-09-13 PROCEDURE — 97140 MANUAL THERAPY 1/> REGIONS: CPT | Performed by: PHYSICAL MEDICINE & REHABILITATION

## 2018-09-13 PROCEDURE — 97110 THERAPEUTIC EXERCISES: CPT | Performed by: PHYSICAL MEDICINE & REHABILITATION

## 2018-09-13 PROCEDURE — 97112 NEUROMUSCULAR REEDUCATION: CPT | Performed by: PHYSICAL MEDICINE & REHABILITATION

## 2018-09-13 NOTE — PROGRESS NOTES
Daily Note     Today's date: 2018  Patient name: Juan C Mace  : 1980  MRN: [id number]  Referring provider: Khang Perez MD  Dx:   Encounter Diagnosis     ICD-10-CM    1  Pain in left hip M25 552    2  Chronic left-sided low back pain without sciatica M54 5     G89 29                 Subjective: Iro reported "I am feeling better and I am not limping, I am still having some pain though "    Objective: See treatment diary below  Precautions: none  Daily Treatment Diary     Manual   9/10/18 2018          Lateral hip distraction w/belt  10 min           Long axis hip distraction  5 min 5min          Quad/hip flex stretch             Prone extension with OP   3x12          L ALS METS   3min              Exercise Diary  2018            Clamshells*             TrA*   2x15          Prone press ups*  2x15           Hip abduction standing             squats             bike  5 min 5min          TrA leg fall outs   3x15          Bridges   3x15                                                                                                                                                         HEP teaching and return demonstration 15min 10min               Modalities                                                       Assessment: Tolerated treatment well  Patient would benefit from continued PT  Iro was able to add bridges and TrA fallouts to her therapy program which shows progress towards her goals  She continues to report sacral pain as her primary complaint  Iro demonstrated increased lumbar ROM following METs  Plan: Continue per plan of care

## 2018-09-17 ENCOUNTER — OFFICE VISIT (OUTPATIENT)
Dept: PHYSICAL THERAPY | Facility: CLINIC | Age: 38
End: 2018-09-17
Payer: COMMERCIAL

## 2018-09-17 DIAGNOSIS — G89.29 CHRONIC LEFT-SIDED LOW BACK PAIN WITHOUT SCIATICA: ICD-10-CM

## 2018-09-17 DIAGNOSIS — M25.552 PAIN IN LEFT HIP: Primary | ICD-10-CM

## 2018-09-17 DIAGNOSIS — M54.50 CHRONIC LEFT-SIDED LOW BACK PAIN WITHOUT SCIATICA: ICD-10-CM

## 2018-09-17 PROCEDURE — 97112 NEUROMUSCULAR REEDUCATION: CPT | Performed by: PHYSICAL MEDICINE & REHABILITATION

## 2018-09-17 PROCEDURE — 97140 MANUAL THERAPY 1/> REGIONS: CPT | Performed by: PHYSICAL MEDICINE & REHABILITATION

## 2018-09-17 PROCEDURE — 97110 THERAPEUTIC EXERCISES: CPT | Performed by: PHYSICAL MEDICINE & REHABILITATION

## 2018-09-17 NOTE — PROGRESS NOTES
Daily Note     Today's date: 2018  Patient name: Woodrow Garcia  : 1980  MRN: [id number]  Referring provider: Awais Olmos MD  Dx:   Encounter Diagnosis     ICD-10-CM    1  Pain in left hip M25 552    2  Chronic left-sided low back pain without sciatica M54 5     G89 29                   Subjective: Iro reported "I am only really having pain at night when I lay down, it seems to happen whenever I lay down, other than that I am doing pretty well "    Objective: See treatment diary below  Precautions: none  Daily Treatment Diary     Manual   9/10/18 2018 2018         Lateral hip distraction w/belt  10 min           Long axis hip distraction  5 min 5min          Quad/hip flex stretch             Prone extension with OP   3x12 3x10         L ALS METS   3min 3min             Exercise Diary  2018            Funmilayo*             TrA*   2x15 2x15         Prone press ups*  2x15           Hip abduction standing             squats    10         bike  5 min 5min 5min         TrA leg fall outs   3x15 3x15         Bridges   3x15 3x15         Neural glides    3x10x2                                                                                                                                           HEP teaching and return demonstration 15min 10min               Modalities                                                         Assessment: Tolerated treatment well  Patient would benefit from continued PT  Iro was able to add squats to her therapy program which shows progress towards her goals  She needed verbal cueing for proper exercise form  She continues to have point tenderness along her sacrum  Plan: Continue per plan of care

## 2018-09-20 ENCOUNTER — OFFICE VISIT (OUTPATIENT)
Dept: PHYSICAL THERAPY | Facility: CLINIC | Age: 38
End: 2018-09-20
Payer: COMMERCIAL

## 2018-09-20 DIAGNOSIS — M25.552 PAIN IN LEFT HIP: Primary | ICD-10-CM

## 2018-09-20 DIAGNOSIS — M54.50 CHRONIC LEFT-SIDED LOW BACK PAIN WITHOUT SCIATICA: ICD-10-CM

## 2018-09-20 DIAGNOSIS — G89.29 CHRONIC LEFT-SIDED LOW BACK PAIN WITHOUT SCIATICA: ICD-10-CM

## 2018-09-20 PROCEDURE — 97140 MANUAL THERAPY 1/> REGIONS: CPT | Performed by: PHYSICAL MEDICINE & REHABILITATION

## 2018-09-20 PROCEDURE — 97112 NEUROMUSCULAR REEDUCATION: CPT | Performed by: PHYSICAL MEDICINE & REHABILITATION

## 2018-09-20 PROCEDURE — 97110 THERAPEUTIC EXERCISES: CPT | Performed by: PHYSICAL MEDICINE & REHABILITATION

## 2018-09-20 NOTE — PROGRESS NOTES
Daily Note     Today's date: 2018  Patient name: Dominic Greco  : 1980  MRN: [id number]  Referring provider: Carlene Castro MD  Dx:   Encounter Diagnosis     ICD-10-CM    1  Pain in left hip M25 552    2  Chronic left-sided low back pain without sciatica M54 5     G89 29                   Subjective: Iro reported "I had a lot of pain with sleeping which is really frustrating "      Objective: See treatment diary below  Precautions: none  Daily Treatment Diary     Manual   9/10/18 2018 2018 2018        Lateral hip distraction w/belt  10 min   10min        Long axis hip distraction  5 min 5min  5min        Quad/hip flex stretch             Prone extension with OP   3x12 3x10 3x10        L ALS METS   3min 3min 3min            Exercise Diary  2018            Clamshells*             TrA*   2x15 2x15 2x15        Prone press ups*  2x15           Hip abduction standing             squats    10         bike  5 min 5min 5min 5min        TrA leg fall outs   3x15 3x15 3x15        Bridges   3x15 3x15 3x15        Neural glides    3x10x2         pallof press     3x10                                                                                                                             HEP teaching and return demonstration 15min 10min               Modalities                                                         Assessment: Tolerated treatment well  Patient would benefit from continued PT  Iro was able to add pallof presses to her therapy program which shows progress towards her goals  Marcie Lennon continues to have discomfort along her sacrum with palpation  When placed into lumbar extension she denies TTP along her sacrum  Plan: Continue per plan of care

## 2018-09-24 ENCOUNTER — OFFICE VISIT (OUTPATIENT)
Dept: PHYSICAL THERAPY | Facility: CLINIC | Age: 38
End: 2018-09-24
Payer: COMMERCIAL

## 2018-09-24 DIAGNOSIS — G89.29 CHRONIC LEFT-SIDED LOW BACK PAIN WITHOUT SCIATICA: ICD-10-CM

## 2018-09-24 DIAGNOSIS — M25.552 PAIN IN LEFT HIP: Primary | ICD-10-CM

## 2018-09-24 DIAGNOSIS — M54.50 CHRONIC LEFT-SIDED LOW BACK PAIN WITHOUT SCIATICA: ICD-10-CM

## 2018-09-24 PROCEDURE — G8978 MOBILITY CURRENT STATUS: HCPCS | Performed by: PHYSICAL MEDICINE & REHABILITATION

## 2018-09-24 PROCEDURE — 97140 MANUAL THERAPY 1/> REGIONS: CPT | Performed by: PHYSICAL MEDICINE & REHABILITATION

## 2018-09-24 PROCEDURE — 97112 NEUROMUSCULAR REEDUCATION: CPT | Performed by: PHYSICAL MEDICINE & REHABILITATION

## 2018-09-24 PROCEDURE — G8979 MOBILITY GOAL STATUS: HCPCS | Performed by: PHYSICAL MEDICINE & REHABILITATION

## 2018-09-24 PROCEDURE — 97110 THERAPEUTIC EXERCISES: CPT | Performed by: PHYSICAL MEDICINE & REHABILITATION

## 2018-09-24 NOTE — PROGRESS NOTES
Daily Note     Today's date: 2018  Patient name: Flora Arteaga  : 1980  MRN: [id number]  Referring provider: Dino Loyola MD  Dx:   Encounter Diagnosis     ICD-10-CM    1  Pain in left hip M25 552    2  Chronic left-sided low back pain without sciatica M54 5     G89 29                   Subjective: Iro reported "I feel better than before, the only thing I really have going on is sleeping and laying on it "      Objective: See treatment diary below  Precautions: none  Daily Treatment Diary     Manual   9/10/18 2018 2018 2018 2018       Lateral hip distraction w/belt  10 min   10min        Long axis hip distraction  5 min 5min  5min        Quad/hip flex stretch             Manual ER stretch      5min       Prone extension with OP   3x12 3x10 3x10 3x10           Exercise Diary  2018            Clamshells*             TrA*   2x15 2x15 2x15 2x15       Prone press ups*  2x15           Hip abduction standing             squats    10  5x10       bike  5 min 5min 5min 5min 5min       TrA leg fall outs   3x15 3x15 3x15        Bridges   3x15 3x15 3x15        Neural glides    3x10x2         pallof press     3x10        Standing calf stretch      [id number]       Seated ER stretch      [id number]                                                                                                  HEP teaching and return demonstration 15min 10min               Modalities                                                         Assessment: Tolerated treatment well  Patient would benefit from continued PT  Iro was able to add calf stretching, ER hip stretching and progress her squats (performing without holding on the bar) without pain which shows progress towards her goals  She needed verbal and tactile cueing for proper squatting form  Following ER stretching she reported decreased pain and TTP along her sacrum  Plan: Continue per plan of care

## 2018-09-27 ENCOUNTER — OFFICE VISIT (OUTPATIENT)
Dept: PHYSICAL THERAPY | Facility: CLINIC | Age: 38
End: 2018-09-27
Payer: COMMERCIAL

## 2018-09-27 DIAGNOSIS — G89.29 CHRONIC LEFT-SIDED LOW BACK PAIN WITHOUT SCIATICA: ICD-10-CM

## 2018-09-27 DIAGNOSIS — M25.552 PAIN IN LEFT HIP: Primary | ICD-10-CM

## 2018-09-27 DIAGNOSIS — M54.50 CHRONIC LEFT-SIDED LOW BACK PAIN WITHOUT SCIATICA: ICD-10-CM

## 2018-09-27 PROCEDURE — 97112 NEUROMUSCULAR REEDUCATION: CPT | Performed by: PHYSICAL MEDICINE & REHABILITATION

## 2018-09-27 PROCEDURE — 97140 MANUAL THERAPY 1/> REGIONS: CPT | Performed by: PHYSICAL MEDICINE & REHABILITATION

## 2018-09-27 PROCEDURE — 97110 THERAPEUTIC EXERCISES: CPT | Performed by: PHYSICAL MEDICINE & REHABILITATION

## 2018-09-27 NOTE — PROGRESS NOTES
Daily Note     Today's date: 2018  Patient name: Hilary Zavala  : 1980  MRN: [id number]  Referring provider: Bailey Wan MD  Dx:   Encounter Diagnosis     ICD-10-CM    1  Pain in left hip M25 552    2  Chronic left-sided low back pain without sciatica M54 5     G89 29        Start Time: 1600  Stop Time: 1648  Total time in clinic (min): 48 minutes    Subjective: Iro denies pain at the beginning of treatment today  Pt states they still have pain on their back, but it is not as bad  Pt denies pain following treatment today  Objective: See treatment diary below  Precautions: none  Daily Treatment Diary     Manual   9/10/18 2018 2018 2018 2018 9/27/18      Lateral hip distraction w/belt  10 min   10min        Long axis hip distraction  5 min 5min  5min        Quad/hip flex stretch             Manual ER stretch      5min       Prone extension with OP   3x12 3x10 3x10 3x10 3x10          Exercise Diary  2018            Funmilayo*             TrA*   2x15 2x15 2x15 2x15       Prone press ups*  2x15           Hip abduction standing             squats    10  5x10 3x15 15lb      bike  5 min 5min 5min 5min 5min 5min      TrA leg fall outs   3x15 3x15 3x15        Bridges   3x15 3x15 3x15        Neural glides    3x10x2         pallof press     3x10        Standing calf stretch      [id number]       Seated ER stretch      [id number]       Lunges       3 laps 10lb      r             Forward plank       3x30"      Side plank       2x30"                                             HEP teaching and return demonstration 15min 10min               Assessment: Tolerated treatment well  Patient would benefit from continued PT  Pt required verbal cues during lunges to correct technique  PT will continue to progress pt to goals  Plan: Continue per plan of care  PT will continue to implement therapeutic exercises to improve pt strength   Pt will continue HEP to improve function and limit pain

## 2018-10-01 ENCOUNTER — OFFICE VISIT (OUTPATIENT)
Dept: PHYSICAL THERAPY | Facility: CLINIC | Age: 38
End: 2018-10-01
Payer: COMMERCIAL

## 2018-10-01 DIAGNOSIS — G89.29 CHRONIC LEFT-SIDED LOW BACK PAIN WITHOUT SCIATICA: ICD-10-CM

## 2018-10-01 DIAGNOSIS — M54.50 CHRONIC LEFT-SIDED LOW BACK PAIN WITHOUT SCIATICA: ICD-10-CM

## 2018-10-01 DIAGNOSIS — M25.552 PAIN IN LEFT HIP: Primary | ICD-10-CM

## 2018-10-01 PROCEDURE — 97112 NEUROMUSCULAR REEDUCATION: CPT | Performed by: PHYSICAL MEDICINE & REHABILITATION

## 2018-10-01 PROCEDURE — G8980 MOBILITY D/C STATUS: HCPCS | Performed by: PHYSICAL MEDICINE & REHABILITATION

## 2018-10-01 PROCEDURE — 97110 THERAPEUTIC EXERCISES: CPT | Performed by: PHYSICAL MEDICINE & REHABILITATION

## 2018-10-01 PROCEDURE — G8979 MOBILITY GOAL STATUS: HCPCS | Performed by: PHYSICAL MEDICINE & REHABILITATION

## 2018-10-01 PROCEDURE — 97140 MANUAL THERAPY 1/> REGIONS: CPT | Performed by: PHYSICAL MEDICINE & REHABILITATION

## 2018-10-01 NOTE — PROGRESS NOTES
PT Discharge    Today's date: 10/1/2018  Patient name: Jadon Masterson  : 1980  MRN: [id number]  Referring provider: Shy Bhat MD  Dx:   Encounter Diagnosis     ICD-10-CM    1  Pain in left hip M25 552    2  Chronic left-sided low back pain without sciatica M54 5     G89 29        Start Time: 1555  Stop Time: 1640  Total time in clinic (min): 45 minutes    Assessment  Impairments: abnormal gait, abnormal or restricted ROM, activity intolerance and pain with function    Assessment details: Wendy Zimmerman is a 41year old female who presents to outpatient physical therapy clinic with a referral for left hip pain and lumbosacral strain  Jadon Masterson has made excellent progress towards his goals and has demonstrated independence with his HEP  Due to her progress towards her goals, her independence with his HEP and her reported self-efficacy with her HEP she is being D/Sunday to her HEP at this time  Thank you for the opportunity to share in Iro's care  If you have any questions do not hesitate to contact me  Thank you  Understanding of Dx/Px/POC: good   Prognosis: good    Goals  ST) Iro will decrease pain worst pain from 10/10 to 6/10 in 4 weeks to increase ADL tolerance  - met  2) Iro will report she is able to walk 1 mile with 50% less pain than at IE within 4 weeks  - met    LT) Iro will report minimal pain throughout full workday in 8 weeks  - met  2) Iro will report she is able to return to her normal gym routine and working out with minimal pain in 8 weeks  -  met  3) Iro will demonstrate proper squat form within 8 weeks to normalize lifting mechanics   - met    Plan  Patient would benefit from: skilled physical therapy  Referral necessary: No  Planned modality interventions: unattended electrical stimulation, thermotherapy: hydrocollator packs, cryotherapy and TENS  Planned therapy interventions: activity modification, ADL training, balance/weight bearing training, coordination, flexibility, functional ROM exercises, gait training, home exercise program, therapeutic exercise, therapeutic activities, stretching, strengthening, patient education, neuromuscular re-education, massage, manual therapy, joint mobilization, body mechanics training, abdominal trunk stabilization, motor coordination training, postural training and therapeutic training  Treatment plan discussed with: patient  Plan details: Pt is being D/Sunday to her HEP  Subjective Evaluation    History of Present Illness  Onset date: 2 months ago  Mechanism of injury: Iro reported "I feel better 99 9%, I only have some pain when I lay face up  That tiny reminder when laying down is the only thing left "  Pain  Current pain ratin  At best pain ratin  At worst pain ratin  Location: sacrum  Quality: discomfort (pt reports pain at night that always wakes her up )  Alleviating factors: states nothing makes it better  Progression: no change    Social Support    Employment status: working ()  Patient Goals  Patient goals for therapy: decreased pain, independence with ADLs/IADLs and return to sport/leisure activities  Patient goal: wants to go to the gym without pain        Objective     Tenderness     Left Hip   Tenderness in the sacroiliac joint  No tenderness in the ischial tuberosity  Additional Tenderness Details  Slight pain over sacrum    Active Range of Motion     Lumbar   Extension: Active lumbar extension: not tested due to pain  Passive Range of Motion   Left Hip   Flexion: 90 degrees     Right Hip   Flexion: [facility]    Additional Passive Range of Motion Details  Patient reported increased pain at 90 degrees of L hip flexion  Strength/Myotome Testing     Additional Strength Details  Did not test strength due to pain    Tests     Left Pelvic Girdle/Sacrum   Positive: sacral spring  Left Hip   Positive TRINITY  Negative Ely's and piriformis       Additional Tests Details  P-A:  L1-L5: patient denied pain, L4-L5 hypomobility  Sacrum: P-A increased pain    Ambulation     Comments   Normalized gait  General Comments     Hip Comments   Long axis distraction decreased pain with a slight increase in ROM  Lateral distraction w/belt showed improved ROM without pain        Flowsheet Rows      Most Recent Value   PT/OT G-Codes   Current Score  88   Projected Score  74   FOTO information reviewed  Yes   Assessment Type  Discharge   G code set  Mobility: Walking & Moving Around   Mobility: Walking and Moving Around Goal Status ()  CH   Mobility: Walking and Moving Around Discharge Status ()  CI          Precautions: none  Daily Treatment Diary     Manual   9/10/18 9/13/2018 9/17/2018 9/20/2018 9/24/2018 9/27/18 10/1/2018     Lateral hip distraction w/belt  10 min   10min        Long axis hip distraction  5 min 5min  5min        Quad/hip flex stretch             Manual ER stretch      5min       Prone extension with OP   3x12 3x10 3x10 3x10 3x10 3x10         Exercise Diary  9/5/2018            Clamshells*             TrA*   2x15 2x15 2x15 2x15  2x15     Prone press ups*  2x15           Hip abduction standing             squats    10  5x10 3x15 15lb 3x15 20lb     bike  5 min 5min 5min 5min 5min 5min 5min     TrA leg fall outs   3x15 3x15 3x15        Bridges   3x15 3x15 3x15        Neural glides    3x10x2         pallof press     3x10        Standing calf stretch      [id number]       Seated ER stretch      [id number]       Lunges       3 laps 10lb 3 laps 20lb                  Forward plank       3x30" 1x30"     Side plank       2x30" 2x30"                                            HEP teaching and return demonstration 15min 10min

## 2018-10-04 ENCOUNTER — APPOINTMENT (OUTPATIENT)
Dept: PHYSICAL THERAPY | Facility: CLINIC | Age: 38
End: 2018-10-04
Payer: COMMERCIAL

## 2018-10-08 ENCOUNTER — APPOINTMENT (OUTPATIENT)
Dept: PHYSICAL THERAPY | Facility: CLINIC | Age: 38
End: 2018-10-08
Payer: COMMERCIAL

## 2018-10-09 ENCOUNTER — APPOINTMENT (OUTPATIENT)
Dept: PHYSICAL THERAPY | Facility: CLINIC | Age: 38
End: 2018-10-09
Payer: COMMERCIAL

## 2018-10-11 ENCOUNTER — ANNUAL EXAM (OUTPATIENT)
Dept: OBGYN CLINIC | Facility: CLINIC | Age: 38
End: 2018-10-11
Payer: COMMERCIAL

## 2018-10-11 VITALS — SYSTOLIC BLOOD PRESSURE: 102 MMHG | WEIGHT: 130 LBS | BODY MASS INDEX: 23.78 KG/M2 | DIASTOLIC BLOOD PRESSURE: 68 MMHG

## 2018-10-11 DIAGNOSIS — Z11.3 SCREENING EXAMINATION FOR VENEREAL DISEASE: ICD-10-CM

## 2018-10-11 DIAGNOSIS — Z01.419 GYNECOLOGIC EXAM NORMAL: Primary | ICD-10-CM

## 2018-10-11 PROBLEM — R87.619 ATYPICAL GLANDULAR CELLS ON CERVICAL PAP SMEAR: Status: ACTIVE | Noted: 2017-12-20

## 2018-10-11 PROBLEM — B97.7 HIGH RISK HPV INFECTION: Status: ACTIVE | Noted: 2017-12-20

## 2018-10-11 PROCEDURE — S0612 ANNUAL GYNECOLOGICAL EXAMINA: HCPCS | Performed by: PHYSICIAN ASSISTANT

## 2018-10-11 NOTE — PROGRESS NOTES
Assessment/Plan   Problem List Items Addressed This Visit     Gynecologic exam normal - Primary     Pap guidelines reviewed  Pap with HPV done today  Script for STD blood work given per patient request    Reviewed with patient ph changes in vaginal canal with menses  Most likely overgrowth of gardnerella bacteria after menses that clears on own  For prevention: Recommend acidophilus or yogurt daily, avoid irritating soaps or lotions, no tight fitted pants or underwear, avoid bubble baths, do not stay in wet swimsuit  Return to office for annual or as needed  Relevant Orders    GP Liquid-Based Pap + HPV Plus      Other Visit Diagnoses     Screening examination for venereal disease        Relevant Orders    Hepatitis C antibody    Hepatitis B surface antigen    HIV 1/2 AG-AB combo    RPR          Subjective:     Patient ID: Leeann Gerard is a 41 y o  y o  female  HPI  42 yo seen for annual exam  Menses regular with no issues  Hx of tubal ligation  Patient reports vaginal odor and discharge right after menses  Typically resolves on own  Fishy odor  No itching, bowel or bladder issues  Last pap: 10/04/2017 atypical glandular cells  Colpo: Benign  The following portions of the patient's history were reviewed and updated as appropriate:   She  has a past medical history of Abnormal Pap smear of cervix; Anemia; Hemorrhoids in pregnancy; Lumbosacral strain (2018); PONV (postoperative nausea and vomiting); Recent childbirth; Varicella; and Visual impairment    She   Patient Active Problem List    Diagnosis Date Noted    Gynecologic exam normal 10/11/2018    Pain in left hip 2018    Lumbosacral strain 2018    Atypical glandular cells on cervical Pap smear 2017    High risk HPV infection 2017    39 weeks gestation of pregnancy 2017     (spontaneous vaginal delivery) 2017     She  has a past surgical history that includes Cervical biopsy; Dilation and curettage of uterus; and LAPAROSCOPIC TUBAL LIGATION (Bilateral, 2017)  Her family history includes Diabetes in her sister; Hypertension in her other  She  reports that she has never smoked  She has never used smokeless tobacco  She reports that she does not drink alcohol or use drugs  Current Outpatient Prescriptions   Medication Sig Dispense Refill    diazepam (VALIUM) 5 mg tablet Take 5 mg by mouth every 6 (six) hours as needed for anxiety      ibuprofen (MOTRIN) 600 mg tablet Take 1 tablet by mouth every 6 (six) hours as needed for mild pain 30 tablet 0    naproxen (EC NAPROSYN) 500 MG EC tablet Take 1 tablet (500 mg total) by mouth 2 (two) times a day with meals 60 tablet 0    Prenatal Vit-Iron Carbonyl-FA (PRENATAL MULTIVITAMIN) TABS Take 1 tablet by mouth daily       No current facility-administered medications for this visit  She has No Known Allergies       Menstrual History:  OB History      Para Term  AB Living    4 3 3 0 1 3    SAB TAB Ectopic Multiple Live Births    1   0 0 3        Obstetric Comments    : 2005 SVDM; SAB;  M PROM         Menarche age: 14  Patient's last menstrual period was 10/04/2018 (approximate)  Period Cycle (Days): 28  Period Duration (Days): 5-7  Period Pattern: Regular  Menstrual Flow: Moderate  Dysmenorrhea: None      Review of Systems   Constitutional: Negative for fatigue, fever and unexpected weight change  HENT: Negative for dental problem and sinus pressure  Eyes: Negative for visual disturbance  Respiratory: Negative for cough, shortness of breath and wheezing  Cardiovascular: Negative for chest pain  Gastrointestinal: Negative for abdominal pain, blood in stool, constipation, diarrhea, nausea and vomiting  Endocrine: Negative for polydipsia  Genitourinary: Negative for difficulty urinating, dyspareunia, dysuria, frequency, hematuria, pelvic pain and urgency     Musculoskeletal: Negative for arthralgias and back pain    Neurological: Negative for dizziness, seizures, light-headedness and headaches  Psychiatric/Behavioral: Negative for suicidal ideas  The patient is not nervous/anxious  Objective:  Vitals:    10/11/18 1614   BP: 102/68   BP Location: Left arm   Patient Position: Sitting   Cuff Size: Standard   Weight: 59 kg (130 lb)      Physical Exam   Constitutional: She is oriented to person, place, and time  She appears well-developed and well-nourished  Genitourinary: Vagina normal and uterus normal  There is no rash, tenderness, lesion, injury or Bartholin's cyst on the right labia  There is no rash, tenderness, lesion, injury or Bartholin's cyst on the left labia  Vagina exhibits no lesion  No erythema, tenderness or bleeding in the vagina  No signs of injury around the vagina  No vaginal discharge found  Right adnexum does not display mass, does not display tenderness and does not display fullness  Left adnexum does not display mass, does not display tenderness and does not display fullness  Cervix does not exhibit motion tenderness, lesion or discharge  Uterus is not enlarged, tender, exhibiting a mass, irregular (is regular) or mobile  HENT:   Head: Normocephalic and atraumatic  Neck: No thyromegaly present  Cardiovascular: Normal rate, regular rhythm and normal heart sounds  Exam reveals no gallop and no friction rub  No murmur heard  Pulmonary/Chest: Effort normal and breath sounds normal  No respiratory distress  She has no wheezes  Right breast exhibits no inverted nipple, no mass, no nipple discharge, no skin change and no tenderness  Left breast exhibits no inverted nipple, no mass, no nipple discharge, no skin change and no tenderness  Breasts are symmetrical  There is no breast swelling  Abdominal: Soft  She exhibits no distension and no mass  There is no tenderness  There is no rebound and no guarding  No hernia  Lymphadenopathy:     She has no cervical adenopathy          Right: No inguinal adenopathy present  Left: No inguinal adenopathy present  Neurological: She is alert and oriented to person, place, and time  Skin: Skin is warm and dry  Psychiatric: She has a normal mood and affect   Her behavior is normal

## 2018-10-15 ENCOUNTER — APPOINTMENT (OUTPATIENT)
Dept: PHYSICAL THERAPY | Facility: CLINIC | Age: 38
End: 2018-10-15
Payer: COMMERCIAL

## 2018-10-15 NOTE — ASSESSMENT & PLAN NOTE
Pap guidelines reviewed  Pap with HPV done today  Script for STD blood work given per patient request    Reviewed with patient ph changes in vaginal canal with menses  Most likely overgrowth of gardnerella bacteria after menses that clears on own  For prevention: Recommend acidophilus or yogurt daily, avoid irritating soaps or lotions, no tight fitted pants or underwear, avoid bubble baths, do not stay in wet swimsuit  Return to office for annual or as needed

## 2018-10-18 ENCOUNTER — APPOINTMENT (OUTPATIENT)
Dept: PHYSICAL THERAPY | Facility: CLINIC | Age: 38
End: 2018-10-18
Payer: COMMERCIAL

## 2018-10-18 LAB
HPV HR 12 DNA CVX QL NAA+PROBE: DETECTED
HPV16 DNA SPEC QL NAA+PROBE: NOT DETECTED
HPV18 DNA SPEC QL NAA+PROBE: NOT DETECTED
THIN PREP CVX: ABNORMAL

## 2018-10-19 ENCOUNTER — TELEPHONE (OUTPATIENT)
Dept: OBGYN CLINIC | Facility: CLINIC | Age: 38
End: 2018-10-19

## 2018-11-01 ENCOUNTER — PROCEDURE VISIT (OUTPATIENT)
Dept: OBGYN CLINIC | Facility: CLINIC | Age: 38
End: 2018-11-01
Payer: COMMERCIAL

## 2018-11-01 VITALS
WEIGHT: 129 LBS | HEIGHT: 62 IN | SYSTOLIC BLOOD PRESSURE: 118 MMHG | BODY MASS INDEX: 23.74 KG/M2 | DIASTOLIC BLOOD PRESSURE: 72 MMHG

## 2018-11-01 DIAGNOSIS — R87.610 ASCUS WITH POSITIVE HIGH RISK HPV CERVICAL: Primary | ICD-10-CM

## 2018-11-01 DIAGNOSIS — R87.810 ASCUS WITH POSITIVE HIGH RISK HPV CERVICAL: Primary | ICD-10-CM

## 2018-11-01 PROCEDURE — 57454 BX/CURETT OF CERVIX W/SCOPE: CPT | Performed by: PHYSICIAN ASSISTANT

## 2018-11-01 NOTE — PROGRESS NOTES
Assessment/Plan:    ASCUS with positive high risk HPV cervical  No intercourse, tampon use, soaking in bath tub, or swimming for the next 5 days to avoid risk of infection  Call office with excessive bleeding, cramping, fever, or chills  Office will call with results and appropriate follow up  Problem List Items Addressed This Visit     ASCUS with positive high risk HPV cervical - Primary     No intercourse, tampon use, soaking in bath tub, or swimming for the next 5 days to avoid risk of infection  Call office with excessive bleeding, cramping, fever, or chills  Office will call with results and appropriate follow up  Relevant Orders    GP Cervical Biopsy    GP ENDO-CERVICAL BIOPSY            Subjective:      Patient ID: Clarisa Hunter is a 41 y o  female  HPI  40 yo seen for colposcopy  LMP: 10/3/2018, patient just went to bathroom and did have very light spotting  Last pap: ASCUS (+)HRHPV non 16, 18  Pap  AGC (+)HRHPV non 16, 18  Endometrial biopsy and colposcopy benign  Patient never a smoker, No family hx of gyn cancers  No new partners in the last year  Denies MASTER exposure  The following portions of the patient's history were reviewed and updated as appropriate:   She  has a past medical history of Abnormal Pap smear of cervix; Anemia; Hemorrhoids in pregnancy; Lumbosacral strain (2018); PONV (postoperative nausea and vomiting); Recent childbirth; Varicella; and Visual impairment    She   Patient Active Problem List    Diagnosis Date Noted    ASCUS with positive high risk HPV cervical 2018    Gynecologic exam normal 10/11/2018    Pain in left hip 2018    Lumbosacral strain 2018    Atypical glandular cells on cervical Pap smear 2017    High risk HPV infection 2017    39 weeks gestation of pregnancy 2017     (spontaneous vaginal delivery) 2017     She  has a past surgical history that includes Cervical biopsy; Dilation and curettage of uterus; and LAPAROSCOPIC TUBAL LIGATION (Bilateral, 6/12/2017)  Her family history includes Diabetes in her sister; Hypertension in her other  She  reports that she has never smoked  She has never used smokeless tobacco  She reports that she does not drink alcohol or use drugs  Current Outpatient Prescriptions   Medication Sig Dispense Refill    diazepam (VALIUM) 5 mg tablet Take 5 mg by mouth every 6 (six) hours as needed for anxiety      ibuprofen (MOTRIN) 600 mg tablet Take 1 tablet by mouth every 6 (six) hours as needed for mild pain 30 tablet 0    naproxen (EC NAPROSYN) 500 MG EC tablet Take 1 tablet (500 mg total) by mouth 2 (two) times a day with meals 60 tablet 0    Prenatal Vit-Iron Carbonyl-FA (PRENATAL MULTIVITAMIN) TABS Take 1 tablet by mouth daily       No current facility-administered medications for this visit  She has No Known Allergies       Review of Systems   Constitutional: Negative for fatigue, fever and unexpected weight change  HENT: Negative for dental problem and sinus pressure  Eyes: Negative for visual disturbance  Respiratory: Negative for cough, shortness of breath and wheezing  Cardiovascular: Negative for chest pain  Gastrointestinal: Negative for abdominal pain, blood in stool, constipation, diarrhea, nausea and vomiting  Endocrine: Negative for polydipsia  Genitourinary: Negative for difficulty urinating, dyspareunia, dysuria, frequency, hematuria, pelvic pain and urgency  Musculoskeletal: Negative for arthralgias and back pain  Neurological: Negative for dizziness, seizures, light-headedness and headaches  Psychiatric/Behavioral: Negative for suicidal ideas  The patient is not nervous/anxious            Objective:      /72 (BP Location: Left arm, Patient Position: Sitting, Cuff Size: Standard)   Ht 5' 2" (1 575 m)   Wt 58 5 kg (129 lb)   LMP 10/04/2018 (Approximate)   BMI 23 59 kg/m²            Physical Exam   Constitutional: She is oriented to person, place, and time  She appears well-developed and well-nourished  Genitourinary: There is no rash, tenderness, lesion or injury on the right labia  There is no rash, tenderness, lesion or injury on the left labia  Cervix exhibits no motion tenderness, no discharge and no friability  No erythema, tenderness or bleeding in the vagina  No foreign body in the vagina  No signs of injury around the vagina  No vaginal discharge found  Neurological: She is alert and oriented to person, place, and time  Skin: Skin is warm and dry  No rash noted  No erythema  No pallor  Colposcopy  Date/Time: 11/1/2018 10:20 AM  Performed by: John Jackson  Authorized by: John Jackson     Consent:     Consent obtained:  Verbal and written    Consent given by:  Patient    Procedural risks discussed:  Bleeding, failure rate, infection, repeat procedure and possible continued pain    Patient questions answered: yes      Patient agrees, verbalizes understanding, and wants to proceed: yes      Educational handouts given: yes      Instructions and paperwork completed: yes    Universal protocol:     Patient states understanding of procedure being performed: yes      Relevant documents present and verified: yes      Test results available and properly labeled: yes    Pre-procedure:     Premeds:  Ibuprofen  Indication:     Indication:  ASC-US  Procedure:     Procedure: Colposcopy w/ cervical biopsy and ECC      Helen speculum was placed in the vagina: yes      Under colposcopic examination the transition zone was seen in entirety: yes      Endocervix was curetted using a Kevorkian curette: yes      Monsel's solution was applied: yes      Biopsy(s): yes      Location:  ECC, cervical biopsy at 10 o'clock  Specimen to pathology: yes    Post-procedure:     Findings: White epithelium      Patient tolerance of procedure:   Tolerated well, no immediate complications

## 2018-11-04 LAB
BIOPSY SPEC-IMP: NORMAL
OTHER STN CVX: NORMAL
WOMEN'S HEALTH PATHOLOGY REPORT: ABNORMAL

## 2018-11-06 ENCOUNTER — TELEPHONE (OUTPATIENT)
Dept: OBGYN CLINIC | Facility: CLINIC | Age: 38
End: 2018-11-06

## 2018-11-06 NOTE — TELEPHONE ENCOUNTER
Patient is returning Na's call  I advised patient Harrison De La Fuente is in with patients, and asked if she would like to speak to nurse  She declined and wants Na to call her back

## 2019-10-04 ENCOUNTER — TELEPHONE (OUTPATIENT)
Dept: OBGYN CLINIC | Facility: CLINIC | Age: 39
End: 2019-10-04

## 2019-11-12 ENCOUNTER — ANNUAL EXAM (OUTPATIENT)
Dept: OBGYN CLINIC | Facility: CLINIC | Age: 39
End: 2019-11-12
Payer: COMMERCIAL

## 2019-11-12 VITALS
WEIGHT: 130 LBS | DIASTOLIC BLOOD PRESSURE: 76 MMHG | SYSTOLIC BLOOD PRESSURE: 110 MMHG | BODY MASS INDEX: 23.92 KG/M2 | HEIGHT: 62 IN

## 2019-11-12 DIAGNOSIS — N92.0 MENORRHAGIA WITH REGULAR CYCLE: ICD-10-CM

## 2019-11-12 DIAGNOSIS — Z01.419 ROUTINE GYNECOLOGICAL EXAMINATION: Primary | ICD-10-CM

## 2019-11-12 DIAGNOSIS — R53.83 FATIGUE, UNSPECIFIED TYPE: ICD-10-CM

## 2019-11-12 PROCEDURE — G0145 SCR C/V CYTO,THINLAYER,RESCR: HCPCS | Performed by: PHYSICIAN ASSISTANT

## 2019-11-12 PROCEDURE — 87624 HPV HI-RISK TYP POOLED RSLT: CPT | Performed by: PHYSICIAN ASSISTANT

## 2019-11-12 PROCEDURE — S0612 ANNUAL GYNECOLOGICAL EXAMINA: HCPCS | Performed by: PHYSICIAN ASSISTANT

## 2019-11-12 NOTE — ASSESSMENT & PLAN NOTE
Pap guidelines reviewed  Pap & HPV done today  Reviewed menorrhagia with patient will get blood work to further evaluate  Reviewed Ibuprofen 600mg Q6hrs with food to help decrease flow on the heavy days  Return to office for annual or as needed

## 2019-11-12 NOTE — PROGRESS NOTES
Assessment/Plan   Problem List Items Addressed This Visit        Other    Routine gynecological examination - Primary     Pap guidelines reviewed  Pap & HPV done today  Reviewed menorrhagia with patient will get blood work to further evaluate  Reviewed Ibuprofen 600mg Q6hrs with food to help decrease flow on the heavy days  Return to office for annual or as needed  Relevant Orders    Liquid-based pap, screening      Other Visit Diagnoses     Menorrhagia with regular cycle        Relevant Orders    CBC and differential    T4, free    TSH, 3rd generation    Fatigue, unspecified type        Relevant Orders    CBC and differential    T4, free    TSH, 3rd generation          Subjective:     Patient ID: Margarito Beard is a 46 y o  y o  female  HPI  47 yo seen for annual exam  Menses regular, heavy for the first 2 days, tolerable  Patient has tubal ligation  Denies bowel or bladder issues  Last pap: 10/11/2018 ASCUS (+)HRHPV non 16, 18  Colpo: LGSIL  The following portions of the patient's history were reviewed and updated as appropriate:   She  has a past medical history of Abnormal Pap smear of cervix, Anemia, Hemorrhoids in pregnancy, Lumbosacral strain (2018), PONV (postoperative nausea and vomiting), Recent childbirth, Varicella, and Visual impairment  She   Patient Active Problem List    Diagnosis Date Noted    Routine gynecological examination 2019    ASCUS with positive high risk HPV cervical 2018    Gynecologic exam normal 10/11/2018    Pain in left hip 2018    Lumbosacral strain 2018    Atypical glandular cells on cervical Pap smear 2017    High risk HPV infection 2017    39 weeks gestation of pregnancy 2017     (spontaneous vaginal delivery) 2017     She  has a past surgical history that includes Cervical biopsy; Dilation and curettage of uterus; and LAPAROSCOPIC TUBAL LIGATION (Bilateral, 2017)    Her family history includes Diabetes in her sister; Hypertension in her other  She  reports that she has never smoked  She has never used smokeless tobacco  She reports that she does not drink alcohol or use drugs  Current Outpatient Medications   Medication Sig Dispense Refill    diazepam (VALIUM) 5 mg tablet Take 5 mg by mouth every 6 (six) hours as needed for anxiety      ibuprofen (MOTRIN) 600 mg tablet Take 1 tablet by mouth every 6 (six) hours as needed for mild pain 30 tablet 0    naproxen (EC NAPROSYN) 500 MG EC tablet Take 1 tablet (500 mg total) by mouth 2 (two) times a day with meals 60 tablet 0    Prenatal Vit-Iron Carbonyl-FA (PRENATAL MULTIVITAMIN) TABS Take 1 tablet by mouth daily       No current facility-administered medications for this visit  She has No Known Allergies       Menstrual History:  OB History        4    Para   3    Term   3       0    AB   1    Living   3       SAB   1    TAB        Ectopic   0    Multiple   0    Live Births   3           Obstetric Comments   :  SVDM; SAB;  M PROM            Menarche age: 16  Patient's last menstrual period was 2019 (exact date)  Period Cycle (Days): 28  Period Duration (Days): 6-7  Period Pattern: Regular  Menstrual Flow: Moderate, Heavy, Light  Dysmenorrhea: None      Review of Systems   Constitutional: Negative for fatigue, fever and unexpected weight change  HENT: Negative for dental problem and sinus pressure  Eyes: Negative for visual disturbance  Respiratory: Negative for cough, shortness of breath and wheezing  Cardiovascular: Negative for chest pain  Gastrointestinal: Negative for abdominal pain, blood in stool, constipation, diarrhea, nausea and vomiting  Endocrine: Negative for polydipsia  Genitourinary: Negative for difficulty urinating, dyspareunia, dysuria, frequency, hematuria, pelvic pain and urgency  Musculoskeletal: Negative for arthralgias and back pain     Neurological: Negative for dizziness, seizures, light-headedness and headaches  Psychiatric/Behavioral: Negative for suicidal ideas  The patient is not nervous/anxious  Objective:  Vitals:    11/12/19 1735   BP: 110/76   BP Location: Left arm   Patient Position: Sitting   Cuff Size: Standard   Weight: 59 kg (130 lb)   Height: 5' 2" (1 575 m)      Physical Exam   Constitutional: She is oriented to person, place, and time  She appears well-developed and well-nourished  Genitourinary: Vagina normal and uterus normal  There is no rash, tenderness, lesion, injury or Bartholin's cyst on the right labia  There is no rash, tenderness, lesion, injury or Bartholin's cyst on the left labia  Vagina exhibits no lesion  No erythema, tenderness or bleeding in the vagina  No signs of injury around the vagina  No vaginal discharge found  Right adnexum does not display mass, does not display tenderness and does not display fullness  Left adnexum does not display mass, does not display tenderness and does not display fullness  Cervix does not exhibit motion tenderness, lesion or discharge  Uterus is not enlarged, tender, exhibiting a mass, irregular (is regular) or mobile  HENT:   Head: Normocephalic and atraumatic  Neck: No thyromegaly present  Cardiovascular: Normal rate, regular rhythm and normal heart sounds  Exam reveals no gallop and no friction rub  No murmur heard  Pulmonary/Chest: Effort normal and breath sounds normal  No respiratory distress  She has no wheezes  Right breast exhibits no inverted nipple, no mass, no nipple discharge, no skin change and no tenderness  Left breast exhibits no inverted nipple, no mass, no nipple discharge, no skin change and no tenderness  No breast swelling  Breasts are symmetrical    Abdominal: Soft  She exhibits no distension and no mass  There is no tenderness  There is no rebound and no guarding  No hernia  Lymphadenopathy:     She has no cervical adenopathy          Right: No inguinal adenopathy present  Left: No inguinal adenopathy present  Neurological: She is alert and oriented to person, place, and time  Skin: Skin is warm and dry  Psychiatric: She has a normal mood and affect   Her behavior is normal

## 2019-11-13 LAB
HPV HR 12 DNA CVX QL NAA+PROBE: NEGATIVE
HPV16 DNA CVX QL NAA+PROBE: NEGATIVE
HPV18 DNA CVX QL NAA+PROBE: NEGATIVE

## 2019-11-18 LAB
LAB AP GYN PRIMARY INTERPRETATION: NORMAL
LAB AP LMP: NORMAL
Lab: NORMAL

## 2020-07-21 ENCOUNTER — TELEPHONE (OUTPATIENT)
Dept: OBGYN CLINIC | Facility: CLINIC | Age: 40
End: 2020-07-21

## 2020-07-21 NOTE — TELEPHONE ENCOUNTER
Pt states noticed swelling Saturday  Pt states in the past usually gets relieve from soaking in water with vinegar, or applying a cotton ball with tea tree oil  Pt states feels like area is getting irritated and has discomfort  Denies any odor, discharge, bleeding or sores  What would you recommend at this time? Pt states may leave detailed message if unable to answer phone while at work

## 2020-07-21 NOTE — TELEPHONE ENCOUNTER
Reviewed with pt  Apt scheduled Thursday  Pt aware if symptoms get worse to go to urgent care or ER

## 2020-07-23 ENCOUNTER — OFFICE VISIT (OUTPATIENT)
Dept: OBGYN CLINIC | Facility: CLINIC | Age: 40
End: 2020-07-23
Payer: COMMERCIAL

## 2020-07-23 VITALS
HEIGHT: 62 IN | BODY MASS INDEX: 24.99 KG/M2 | SYSTOLIC BLOOD PRESSURE: 134 MMHG | TEMPERATURE: 97.7 F | DIASTOLIC BLOOD PRESSURE: 82 MMHG | WEIGHT: 135.8 LBS

## 2020-07-23 DIAGNOSIS — N75.0 BARTHOLIN GLAND CYST: Primary | ICD-10-CM

## 2020-07-23 DIAGNOSIS — B37.3 YEAST VAGINITIS: ICD-10-CM

## 2020-07-23 PROBLEM — Z3A.39 39 WEEKS GESTATION OF PREGNANCY: Status: RESOLVED | Noted: 2017-04-30 | Resolved: 2020-07-23

## 2020-07-23 PROBLEM — Z01.419 ROUTINE GYNECOLOGICAL EXAMINATION: Status: RESOLVED | Noted: 2019-11-12 | Resolved: 2020-07-23

## 2020-07-23 PROBLEM — B97.7 HIGH RISK HPV INFECTION: Status: RESOLVED | Noted: 2017-12-20 | Resolved: 2020-07-23

## 2020-07-23 PROBLEM — R87.619 ATYPICAL GLANDULAR CELLS ON CERVICAL PAP SMEAR: Status: RESOLVED | Noted: 2017-12-20 | Resolved: 2020-07-23

## 2020-07-23 PROBLEM — Z01.419 GYNECOLOGIC EXAM NORMAL: Status: RESOLVED | Noted: 2018-10-11 | Resolved: 2020-07-23

## 2020-07-23 PROCEDURE — 3008F BODY MASS INDEX DOCD: CPT | Performed by: OBSTETRICS & GYNECOLOGY

## 2020-07-23 PROCEDURE — 99213 OFFICE O/P EST LOW 20 MIN: CPT | Performed by: OBSTETRICS & GYNECOLOGY

## 2020-07-23 PROCEDURE — 56420 I&D BARTHOLINS GLAND ABSCESS: CPT | Performed by: OBSTETRICS & GYNECOLOGY

## 2020-07-23 RX ORDER — FLUCONAZOLE 150 MG/1
150 TABLET ORAL EVERY OTHER DAY
Qty: 2 TABLET | Refills: 0 | Status: SHIPPED | OUTPATIENT
Start: 2020-07-23 | End: 2020-07-26

## 2020-07-23 NOTE — PROGRESS NOTES
Assessment/Plan:    Left Bartholin cyst with vaginal yeast infection  Bartholin cyst incised and drained patient tolerated the procedure well and discharge instructions reviewed  Script sent for Diflucan for yeast infection  Return to office for annual earlier as needed       Problem List Items Addressed This Visit        Genitourinary    Yeast vaginitis - Primary    Relevant Medications    fluconazole (DIFLUCAN) 150 mg tablet    Bartholin gland cyst            Subjective:      Patient ID: Leeann Gerard is a 45 y o  female  Here for problem visit - left side labia is swollen for about a week- very uncomfortable - now is itching and prior was uncomfortable - noted some discharge - whitish with mild odor (musty) - menses regular and not an issue - s/p tubal ligation - no fevers or chills, no pelvic pain       The following portions of the patient's history were reviewed and updated as appropriate: allergies, current medications, past family history, past medical history, past social history, past surgical history and problem list     Review of Systems   Constitutional: Negative for chills, fatigue, fever and unexpected weight change  HENT: Negative for dental problem, sinus pressure and sinus pain  Eyes: Negative for visual disturbance  Respiratory: Negative for cough, shortness of breath and wheezing  Cardiovascular: Negative for chest pain and leg swelling  Gastrointestinal: Negative for constipation, diarrhea, nausea and vomiting  Genitourinary: Negative for urgency  Musculoskeletal: Negative for back pain and joint swelling  Allergic/Immunologic: Negative for environmental allergies  Neurological: Negative for dizziness and headaches  Psychiatric/Behavioral: The patient is not nervous/anxious            Objective:      /82 (BP Location: Left arm, Patient Position: Sitting, Cuff Size: Standard)   Temp 97 7 °F (36 5 °C)   Ht 5' 2" (1 575 m)   Wt 61 6 kg (135 lb 12 8 oz) Breastfeeding No   BMI 24 84 kg/m²            Physical Exam   Constitutional: She is oriented to person, place, and time  She appears well-developed and well-nourished  Young black female    HENT:   Head: Normocephalic and atraumatic  Genitourinary:   Genitourinary Comments: Normal female, the left vulva is markedly swollen with the area being isolated to around the left Bartholin gland  Right vulva is normal   Examination showed the vaginal tissue to be normal cervix grossly normal appearance there is a moderate amount of whitish discharge present in the vault  Neurological: She is alert and oriented to person, place, and time  Skin: She is not diaphoretic  Psychiatric: She has a normal mood and affect  Her behavior is normal    Vitals reviewed  Incision and drain  Date/Time: 7/23/2020 9:40 AM  Performed by: Rosa M Reilly MD  Authorized by: Rosa M Reilly MD     Patient location:  Clinic  Other Assisting Provider: No    Consent:     Consent obtained:  Verbal    Consent given by:  Patient    Risks discussed:  Bleeding, infection and pain    Alternatives discussed:  No treatment  Universal protocol:     Procedure explained and questions answered to patient or proxy's satisfaction: yes      Patient identity confirmed:  Verbally with patient  Location:     Type:  Bartholin cyst    Size:  4 cm  Pre-procedure details:     Skin preparation:  Betadine  Anesthesia (see MAR for exact dosages): Anesthesia method:  Local infiltration    Local anesthetic:  Lidocaine 1% w/o epi (2 cc)  Procedure details:     Complexity:  Simple    Needle aspiration: no      Incision types:  Stab incision    Scalpel blade:  11    Approach:  Puncture    Incision depth:  Subcutaneous    Wound management:  Irrigated with saline (mixed with hydrogen peroxide)    Irrigation with saline:  10 cc     Drainage:  Bloody    Drainage amount:   Moderate    Wound treatment:  Wound left open    Packing materials:  None  Post-procedure details: Patient tolerance of procedure:   Tolerated well, no immediate complications

## 2020-10-26 ENCOUNTER — TELEPHONE (OUTPATIENT)
Dept: OBGYN CLINIC | Facility: CLINIC | Age: 40
End: 2020-10-26

## (undated) DEVICE — GLOVE INDICATOR PI UNDERGLOVE SZ 6.5 BLUE

## (undated) DEVICE — ADHESIVE SKN CLSR HISTOACRYL FLEX 0.5ML LF

## (undated) DEVICE — CHLORAPREP HI-LITE 10.5ML ORANGE

## (undated) DEVICE — ANTI-FOG SOLUTION WITH FOAM PAD: Brand: DEVON

## (undated) DEVICE — 3000CC GUARDIAN II: Brand: GUARDIAN

## (undated) DEVICE — NEEDLE 25GA X 1 IN SAFETY GLIDE

## (undated) DEVICE — SUT VICRYL 4-0 PS-2 27 IN J426H

## (undated) DEVICE — ENDOPATH XCEL BLADELESS TROCARS WITH STABILITY SLEEVES: Brand: ENDOPATH XCEL

## (undated) DEVICE — STERILE MINOR LAPAROSCOPY PACK: Brand: CARDINAL HEALTH

## (undated) DEVICE — INTENDED FOR TISSUE SEPARATION, AND OTHER PROCEDURES THAT REQUIRE A SHARP SURGICAL BLADE TO PUNCTURE OR CUT.: Brand: BARD-PARKER SAFETY BLADES SIZE 11, STERILE

## (undated) DEVICE — GLOVE SRG BIOGEL 6.5